# Patient Record
Sex: MALE | Race: WHITE | Employment: OTHER | URBAN - METROPOLITAN AREA
[De-identification: names, ages, dates, MRNs, and addresses within clinical notes are randomized per-mention and may not be internally consistent; named-entity substitution may affect disease eponyms.]

---

## 2018-09-14 ENCOUNTER — HOSPITAL ENCOUNTER (OUTPATIENT)
Dept: PREADMISSION TESTING | Age: 73
Discharge: HOME | End: 2018-09-14

## 2018-09-14 VITALS
WEIGHT: 196.13 LBS | SYSTOLIC BLOOD PRESSURE: 149 MMHG | BODY MASS INDEX: 29.05 KG/M2 | HEART RATE: 75 BPM | TEMPERATURE: 98.1 F | HEIGHT: 69 IN | DIASTOLIC BLOOD PRESSURE: 68 MMHG

## 2018-09-14 NOTE — PERIOP NOTES
PREOPERATIVE INSTRUCTIONS REVIEWED WITH PATIENT. Rabia Seay PATIENT GIVEN SSI INFECTIONS SHEET. PATIENT WAS GIVEN THE OPPORTUNITY TO ASK QUESTIONS ON THE INFORMATION PROVIDED.

## 2018-09-23 ENCOUNTER — ANESTHESIA EVENT (OUTPATIENT)
Dept: MEDSURG UNIT | Age: 73
End: 2018-09-23
Payer: MEDICARE

## 2018-09-24 ENCOUNTER — HOSPITAL ENCOUNTER (OUTPATIENT)
Age: 73
Setting detail: OUTPATIENT SURGERY
Discharge: HOME | End: 2018-09-24
Attending: SURGERY | Admitting: SURGERY
Payer: MEDICARE

## 2018-09-24 ENCOUNTER — ANESTHESIA (OUTPATIENT)
Dept: MEDSURG UNIT | Age: 73
End: 2018-09-24
Payer: MEDICARE

## 2018-09-24 VITALS
HEART RATE: 77 BPM | DIASTOLIC BLOOD PRESSURE: 88 MMHG | HEIGHT: 69 IN | WEIGHT: 196 LBS | BODY MASS INDEX: 29.03 KG/M2 | RESPIRATION RATE: 17 BRPM | SYSTOLIC BLOOD PRESSURE: 141 MMHG | TEMPERATURE: 98 F | OXYGEN SATURATION: 98 %

## 2018-09-24 PROBLEM — C44.111: Status: ACTIVE | Noted: 2018-09-24

## 2018-09-24 PROBLEM — C44.319 BASAL CELL CARCINOMA (BCC) OF FOREHEAD: Status: ACTIVE | Noted: 2018-09-24

## 2018-09-24 PROBLEM — L91.8 INFLAMED SKIN TAG: Status: ACTIVE | Noted: 2018-09-24

## 2018-09-24 PROCEDURE — 77030032490 HC SLV COMPR SCD KNE COVD -B: Performed by: SURGERY

## 2018-09-24 PROCEDURE — 76060000062 HC AMB SURG ANES 1 TO 1.5 HR: Performed by: SURGERY

## 2018-09-24 PROCEDURE — 74011000250 HC RX REV CODE- 250: Performed by: SURGERY

## 2018-09-24 PROCEDURE — 77030002996 HC SUT SLK J&J -A: Performed by: SURGERY

## 2018-09-24 PROCEDURE — 76030000001 HC AMB SURG OR TIME 1 TO 1.5: Performed by: SURGERY

## 2018-09-24 PROCEDURE — 88331 PATH CONSLTJ SURG 1 BLK 1SPC: CPT | Performed by: SURGERY

## 2018-09-24 PROCEDURE — 76210000040 HC AMBSU PH I REC FIRST 0.5 HR: Performed by: SURGERY

## 2018-09-24 PROCEDURE — 74011250636 HC RX REV CODE- 250/636: Performed by: ANESTHESIOLOGY

## 2018-09-24 PROCEDURE — 77030010829: Performed by: SURGERY

## 2018-09-24 PROCEDURE — 74011250636 HC RX REV CODE- 250/636

## 2018-09-24 PROCEDURE — 76210000050 HC AMBSU PH II REC 0.5 TO 1 HR: Performed by: SURGERY

## 2018-09-24 PROCEDURE — 74011000250 HC RX REV CODE- 250

## 2018-09-24 PROCEDURE — 77030011640 HC PAD GRND REM COVD -A: Performed by: SURGERY

## 2018-09-24 PROCEDURE — 77030020262 HC SOL INJ SOD CL 0.9% 100ML: Performed by: SURGERY

## 2018-09-24 PROCEDURE — 88305 TISSUE EXAM BY PATHOLOGIST: CPT | Performed by: SURGERY

## 2018-09-24 PROCEDURE — 77030002888 HC SUT CHRMC J&J -A: Performed by: SURGERY

## 2018-09-24 PROCEDURE — 77030018836 HC SOL IRR NACL ICUM -A: Performed by: SURGERY

## 2018-09-24 RX ORDER — PROPOFOL 10 MG/ML
INJECTION, EMULSION INTRAVENOUS AS NEEDED
Status: DISCONTINUED | OUTPATIENT
Start: 2018-09-24 | End: 2018-09-24 | Stop reason: HOSPADM

## 2018-09-24 RX ORDER — DIPHENHYDRAMINE HYDROCHLORIDE 50 MG/ML
12.5 INJECTION, SOLUTION INTRAMUSCULAR; INTRAVENOUS AS NEEDED
Status: DISCONTINUED | OUTPATIENT
Start: 2018-09-24 | End: 2018-09-24 | Stop reason: HOSPADM

## 2018-09-24 RX ORDER — TETRACAINE HYDROCHLORIDE 5 MG/ML
SOLUTION OPHTHALMIC AS NEEDED
Status: DISCONTINUED | OUTPATIENT
Start: 2018-09-24 | End: 2018-09-24 | Stop reason: HOSPADM

## 2018-09-24 RX ORDER — SODIUM CHLORIDE 0.9 % (FLUSH) 0.9 %
5-10 SYRINGE (ML) INJECTION AS NEEDED
Status: DISCONTINUED | OUTPATIENT
Start: 2018-09-24 | End: 2018-09-24 | Stop reason: HOSPADM

## 2018-09-24 RX ORDER — ONDANSETRON 2 MG/ML
4 INJECTION INTRAMUSCULAR; INTRAVENOUS AS NEEDED
Status: DISCONTINUED | OUTPATIENT
Start: 2018-09-24 | End: 2018-09-24 | Stop reason: HOSPADM

## 2018-09-24 RX ORDER — PROPOFOL 10 MG/ML
INJECTION, EMULSION INTRAVENOUS
Status: DISCONTINUED | OUTPATIENT
Start: 2018-09-24 | End: 2018-09-24 | Stop reason: HOSPADM

## 2018-09-24 RX ORDER — MIDAZOLAM HYDROCHLORIDE 1 MG/ML
1 INJECTION, SOLUTION INTRAMUSCULAR; INTRAVENOUS AS NEEDED
Status: DISCONTINUED | OUTPATIENT
Start: 2018-09-24 | End: 2018-09-24 | Stop reason: HOSPADM

## 2018-09-24 RX ORDER — SODIUM CHLORIDE 0.9 % (FLUSH) 0.9 %
5-10 SYRINGE (ML) INJECTION EVERY 8 HOURS
Status: DISCONTINUED | OUTPATIENT
Start: 2018-09-24 | End: 2018-09-24 | Stop reason: HOSPADM

## 2018-09-24 RX ORDER — MORPHINE SULFATE 10 MG/ML
2 INJECTION, SOLUTION INTRAMUSCULAR; INTRAVENOUS
Status: DISCONTINUED | OUTPATIENT
Start: 2018-09-24 | End: 2018-09-24 | Stop reason: HOSPADM

## 2018-09-24 RX ORDER — SODIUM CHLORIDE, SODIUM LACTATE, POTASSIUM CHLORIDE, CALCIUM CHLORIDE 600; 310; 30; 20 MG/100ML; MG/100ML; MG/100ML; MG/100ML
1000 INJECTION, SOLUTION INTRAVENOUS CONTINUOUS
Status: DISCONTINUED | OUTPATIENT
Start: 2018-09-24 | End: 2018-09-24 | Stop reason: HOSPADM

## 2018-09-24 RX ORDER — KETAMINE HYDROCHLORIDE 100 MG/ML
INJECTION, SOLUTION INTRAMUSCULAR; INTRAVENOUS AS NEEDED
Status: DISCONTINUED | OUTPATIENT
Start: 2018-09-24 | End: 2018-09-24 | Stop reason: HOSPADM

## 2018-09-24 RX ORDER — OXYCODONE HYDROCHLORIDE 5 MG/1
5 TABLET ORAL AS NEEDED
Status: DISCONTINUED | OUTPATIENT
Start: 2018-09-24 | End: 2018-09-24 | Stop reason: HOSPADM

## 2018-09-24 RX ORDER — BACITRACIN 500 [USP'U]/G
OINTMENT OPHTHALMIC AS NEEDED
Status: DISCONTINUED | OUTPATIENT
Start: 2018-09-24 | End: 2018-09-24 | Stop reason: HOSPADM

## 2018-09-24 RX ORDER — BACITRACIN 500 [USP'U]/G
OINTMENT TOPICAL AS NEEDED
Status: DISCONTINUED | OUTPATIENT
Start: 2018-09-24 | End: 2018-09-24 | Stop reason: HOSPADM

## 2018-09-24 RX ORDER — SODIUM CHLORIDE 9 MG/ML
25 INJECTION, SOLUTION INTRAVENOUS CONTINUOUS
Status: DISCONTINUED | OUTPATIENT
Start: 2018-09-24 | End: 2018-09-24 | Stop reason: HOSPADM

## 2018-09-24 RX ORDER — LIDOCAINE HYDROCHLORIDE AND EPINEPHRINE 10; 10 MG/ML; UG/ML
INJECTION, SOLUTION INFILTRATION; PERINEURAL AS NEEDED
Status: DISCONTINUED | OUTPATIENT
Start: 2018-09-24 | End: 2018-09-24 | Stop reason: HOSPADM

## 2018-09-24 RX ORDER — MIDAZOLAM HYDROCHLORIDE 1 MG/ML
INJECTION, SOLUTION INTRAMUSCULAR; INTRAVENOUS AS NEEDED
Status: DISCONTINUED | OUTPATIENT
Start: 2018-09-24 | End: 2018-09-24 | Stop reason: HOSPADM

## 2018-09-24 RX ORDER — FENTANYL CITRATE 50 UG/ML
50 INJECTION, SOLUTION INTRAMUSCULAR; INTRAVENOUS AS NEEDED
Status: DISCONTINUED | OUTPATIENT
Start: 2018-09-24 | End: 2018-09-24 | Stop reason: HOSPADM

## 2018-09-24 RX ORDER — MIDAZOLAM HYDROCHLORIDE 1 MG/ML
0.5 INJECTION, SOLUTION INTRAMUSCULAR; INTRAVENOUS
Status: DISCONTINUED | OUTPATIENT
Start: 2018-09-24 | End: 2018-09-24 | Stop reason: HOSPADM

## 2018-09-24 RX ORDER — LIDOCAINE HYDROCHLORIDE 10 MG/ML
0.1 INJECTION, SOLUTION EPIDURAL; INFILTRATION; INTRACAUDAL; PERINEURAL AS NEEDED
Status: DISCONTINUED | OUTPATIENT
Start: 2018-09-24 | End: 2018-09-24 | Stop reason: HOSPADM

## 2018-09-24 RX ORDER — BACITRACIN ZINC AND POLYMYXIN B SULFATE 500; 10000 [USP'U]/G; [USP'U]/G
OINTMENT OPHTHALMIC AS NEEDED
Status: DISCONTINUED | OUTPATIENT
Start: 2018-09-24 | End: 2018-09-24 | Stop reason: HOSPADM

## 2018-09-24 RX ORDER — SODIUM CHLORIDE, SODIUM LACTATE, POTASSIUM CHLORIDE, CALCIUM CHLORIDE 600; 310; 30; 20 MG/100ML; MG/100ML; MG/100ML; MG/100ML
100 INJECTION, SOLUTION INTRAVENOUS CONTINUOUS
Status: DISCONTINUED | OUTPATIENT
Start: 2018-09-24 | End: 2018-09-24 | Stop reason: HOSPADM

## 2018-09-24 RX ORDER — FENTANYL CITRATE 50 UG/ML
INJECTION, SOLUTION INTRAMUSCULAR; INTRAVENOUS AS NEEDED
Status: DISCONTINUED | OUTPATIENT
Start: 2018-09-24 | End: 2018-09-24 | Stop reason: HOSPADM

## 2018-09-24 RX ORDER — FENTANYL CITRATE 50 UG/ML
25 INJECTION, SOLUTION INTRAMUSCULAR; INTRAVENOUS
Status: DISCONTINUED | OUTPATIENT
Start: 2018-09-24 | End: 2018-09-24 | Stop reason: HOSPADM

## 2018-09-24 RX ORDER — ROPIVACAINE HYDROCHLORIDE 5 MG/ML
150 INJECTION, SOLUTION EPIDURAL; INFILTRATION; PERINEURAL AS NEEDED
Status: DISCONTINUED | OUTPATIENT
Start: 2018-09-24 | End: 2018-09-24 | Stop reason: HOSPADM

## 2018-09-24 RX ORDER — CEFAZOLIN SODIUM IN 0.9 % NACL 2 G/100 ML
PLASTIC BAG, INJECTION (ML) INTRAVENOUS AS NEEDED
Status: DISCONTINUED | OUTPATIENT
Start: 2018-09-24 | End: 2018-09-24 | Stop reason: HOSPADM

## 2018-09-24 RX ADMIN — FENTANYL CITRATE 25 MCG: 50 INJECTION, SOLUTION INTRAMUSCULAR; INTRAVENOUS at 07:30

## 2018-09-24 RX ADMIN — SODIUM CHLORIDE, SODIUM LACTATE, POTASSIUM CHLORIDE, AND CALCIUM CHLORIDE 1000 ML: 600; 310; 30; 20 INJECTION, SOLUTION INTRAVENOUS at 07:12

## 2018-09-24 RX ADMIN — PROPOFOL 50 MCG/KG/MIN: 10 INJECTION, EMULSION INTRAVENOUS at 07:38

## 2018-09-24 RX ADMIN — KETAMINE HYDROCHLORIDE 20 MG: 100 INJECTION, SOLUTION INTRAMUSCULAR; INTRAVENOUS at 07:39

## 2018-09-24 RX ADMIN — PROPOFOL 20 MG: 10 INJECTION, EMULSION INTRAVENOUS at 08:10

## 2018-09-24 RX ADMIN — PROPOFOL 20 MG: 10 INJECTION, EMULSION INTRAVENOUS at 07:35

## 2018-09-24 RX ADMIN — Medication 2 G: at 07:44

## 2018-09-24 RX ADMIN — MIDAZOLAM HYDROCHLORIDE 2 MG: 1 INJECTION, SOLUTION INTRAMUSCULAR; INTRAVENOUS at 07:30

## 2018-09-24 NOTE — ANESTHESIA POSTPROCEDURE EVALUATION
Post-Anesthesia Evaluation and Assessment Patient: Britney Wood MRN: [id number]  SSN: [id number] YOB: 1945  Age: 68 y.o. Sex: male Cardiovascular Function/Vital Signs Visit Vitals  /85  Pulse 84  Temp 36.7 °C (98.1 °F)  Resp 14  
 Ht 5' 9\" (1.753 m)  Wt 88.9 kg (196 lb)  SpO2 96%  BMI 28.94 kg/m2 Patient is status post MAC anesthesia for Procedure(s): EXCISION BASAL CELL CARCINOMA RIGHT MEDIAL CANTHUS FROZEN SECTION AND , EXCISION BASAL CELL CARCINOMA RIGHT FOREHEAD WITH REPAIR ,EXCISION SKIN TAG RIGHT NECK. Nausea/Vomiting: None Postoperative hydration reviewed and adequate. Pain: 
Pain Scale 1: Numeric (0 - 10) (09/24/18 0836) Pain Intensity 1: 0 (09/24/18 0836) Managed Neurological Status:  
Neuro (WDL): Within Defined Limits (09/24/18 0836) Neuro LUE Motor Response: Purposeful (09/24/18 0836) LLE Motor Response: Purposeful (09/24/18 0836) RUE Motor Response: Purposeful (09/24/18 0836) RLE Motor Response: Purposeful (09/24/18 0836) At baseline Mental Status and Level of Consciousness: Arousable Pulmonary Status:  
O2 Device: Room air (09/24/18 5764) Adequate oxygenation and airway patent Complications related to anesthesia: None Post-anesthesia assessment completed. No concerns Signed By: Siva Lea MD   
 September 24, 2018

## 2018-09-24 NOTE — DISCHARGE INSTRUCTIONS
Brady Peterson M.D., F.A.C.S. Stiven Thibodeaux M.D., F.A.C.S.,  Iain Causey III, M.D., F.A.C.S. Yen Priest M.D.,  Tevin Patel M.D. Instructions after Minor Plastic Surgery Procedures      You have had a minor surgical procedure. Please follow these simple instructions to help ensure a safe and comfortable recovery. Any questions can be directed to the office at [phone]. 1. If a bandage has been placed, it can be removed or changed at 24-48 hours after surgery. Wounds of the scalp are not usually bandaged, except in special situations. 2. Expect a modest amount of redness (inflammation) and possibly some bruising to appear in the days following your surgery. This is normal and will resolve as the wound heals, but may persist until the stitches have been removed. 3. The appearance of excessive wound redness, pus or fever is not normal and should be reported to the office. 4. Wounds may be gently washed with mild soap and water beginning 24 hours after surgery, then patted dry. Scalp wounds may be gently washed (mild shampoo) and gently dried as well. Use bacitracin eye ointment on the sutures of your medial canthus. Use regular bacitracin ointment on the sutures on your forehead and neck. 5. Antibiotic ointment should be lightly applied 2-3 times per day to any wound. Replace Band-Aid or other dressing as instructed. 6. Suture removal will be arranged in 5-14 days, depending upon the site. The pathologists report will be discussed with you then. Your appointment is ____CALL TO SCHEDULE for 1 week from now [phone]_.     7. Mild to moderate pain is to be expected after minor surgery and will generally be relieved by the use of Tylenol or ibuprofen agents (Advil, Motrin, Nuprin, etc.)     8.  Swelling or discomfort will be improved by elevating the surgical site above the level of the heart, especially the face, scalp or arms, which can be elevated in bed by extra pillows. 9. Do not be concerned if one or even several sutures come out prior to the time of suture removal. It is not unusual for this to occur as the wound swelling decreases. Support of the remaining sutures is sufficient to protect your wound(s). 10. If appropriate, copies of the surgery and pathology reports will be sent to your doctor. 11. Please call the office at [phone] if you have any questions. I acknowledge receipt of the above discharge instructions:    Patient/Guardian Signature _______________________________________ Date___________________    Daniel Vance Signature_______________________________________________ Date___________________        DISCHARGE SUMMARY from Nurse    PATIENT INSTRUCTIONS:    After general anesthesia or intravenous sedation, for 24 hours or while taking prescription Narcotics:  · Limit your activities  · Do not drive and operate hazardous machinery  · Do not make important personal or business decisions  · Do  not drink alcoholic beverages  · If you have not urinated within 8 hours after discharge, please contact your surgeon on call. Report the following to your surgeon:  · Excessive pain, swelling, redness or odor of or around the surgical area  · Temperature over 100.5  · Nausea and vomiting lasting longer than 4 hours or if unable to take medications  · Any signs of decreased circulation or nerve impairment to extremity: change in color, persistent  numbness, tingling, coldness or increase pain  · Any questions    What to do at Home:    If you experience any of the above symptoms, please follow up with Dr. Pat Trent. *  Please give a list of your current medications to your Primary Care Provider. *  Please update this list whenever your medications are discontinued, doses are      changed, or new medications (including over-the-counter products) are added.     *  Please carry medication information at all times in case of emergency situations. These are general instructions for a healthy lifestyle:    No smoking/ No tobacco products/ Avoid exposure to second hand smoke  Surgeon General's Warning:  Quitting smoking now greatly reduces serious risk to your health. Obesity, smoking, and sedentary lifestyle greatly increases your risk for illness    A healthy diet, regular physical exercise & weight monitoring are important for maintaining a healthy lifestyle    You may be retaining fluid if you have a history of heart failure or if you experience any of the following symptoms:  Weight gain of 3 pounds or more overnight or 5 pounds in a week, increased swelling in our hands or feet or shortness of breath while lying flat in bed. Please call your doctor as soon as you notice any of these symptoms; do not wait until your next office visit. Recognize signs and symptoms of STROKE:    F-face looks uneven    A-arms unable to move or move unevenly    S-speech slurred or non-existent    T-time-call 911 as soon as signs and symptoms begin-DO NOT go       Back to bed or wait to see if you get better-TIME IS BRAIN. Warning Signs of HEART ATTACK     Call 911 if you have these symptoms:   Chest discomfort. Most heart attacks involve discomfort in the center of the chest that lasts more than a few minutes, or that goes away and comes back. It can feel like uncomfortable pressure, squeezing, fullness, or pain.  Discomfort in other areas of the upper body. Symptoms can include pain or discomfort in one or both arms, the back, neck, jaw, or stomach.  Shortness of breath with or without chest discomfort.  Other signs may include breaking out in a cold sweat, nausea, or lightheadedness. Don't wait more than five minutes to call 911 - MINUTES MATTER! Fast action can save your life. Calling 911 is almost always the fastest way to get lifesaving treatment.  Emergency Medical Services staff can begin treatment when they arrive -- up to an hour sooner than if someone gets to the hospital by car. The discharge information has been reviewed with the patient and spouse. The patient and spouse verbalized understanding. Discharge medications reviewed with the patient and spouse and appropriate educational materials and side effects teaching were provided.

## 2018-09-24 NOTE — IP AVS SNAPSHOT
[address] [facility] [place] [address] 
[phone] Patient: Ayden Yepez MRN: [id number] THI:[id number] About your hospitalization You were admitted on:  September 24, 2018 You last received care in the:  [facility] PACU You were discharged on:  September 24, 2018 Why you were hospitalized Your primary diagnosis was:  Not on File Your diagnoses also included:  Basal Cell Carcinoma, Eyelid, Right, Inflamed Skin Tag, Basal Cell Carcinoma (Bcc) Of Forehead Follow-up Information Follow up With Details Comments Contact Info Micha Hutchison MD   Patient can only remember the practice name and not the physician 
  
 Osmar Cherry MD Schedule an appointment as soon as possible for a visit in 1 week(s)  [address] [facility] [address] [facility] 
[phone] Discharge Orders None A check landon indicates which time of day the medication should be taken. My Medications CONTINUE taking these medications Instructions Each Dose to Equal  
 Morning Noon Evening Bedtime  
 aspirin 81 mg tablet Your last dose was: Your next dose is: Take 81 mg by mouth daily. 81 mg  
    
   
   
   
  
 CRESTOR 5 mg tablet Generic drug:  rosuvastatin Your last dose was: Your next dose is: Take  by mouth daily. TRICOR PO Your last dose was: Your next dose is: Take 56 mg by mouth daily. 56 mg Discharge Instructions Dior Hopson M.D., F.A.C.S. Lyn Sanderson M.D., F.A.C.S.,  Peyton Lopez III, M.D., F.A.C.S. Kristian Kelly M.D.,  Yulia Rivera M.D. Instructions after Minor Plastic Surgery Procedures You have had a minor surgical procedure.  Please follow these simple instructions to help ensure a safe and comfortable recovery. Any questions can be directed to the office at [phone]. 1. If a bandage has been placed, it can be removed or changed at 24-48 hours after surgery. Wounds of the scalp are not usually bandaged, except in special situations. 2. Expect a modest amount of redness (inflammation) and possibly some bruising to appear in the days following your surgery. This is normal and will resolve as the wound heals, but may persist until the stitches have been removed. 3. The appearance of excessive wound redness, pus or fever is not normal and should be reported to the office. 4. Wounds may be gently washed with mild soap and water beginning 24 hours after surgery, then patted dry. Scalp wounds may be gently washed (mild shampoo) and gently dried as well. Use bacitracin eye ointment on the sutures of your medial canthus. Use regular bacitracin ointment on the sutures on your forehead and neck. 5. Antibiotic ointment should be lightly applied 2-3 times per day to any wound. Replace Band-Aid or other dressing as instructed. 6. Suture removal will be arranged in 5-14 days, depending upon the site. The pathologists report will be discussed with you then. Your appointment is ____CALL TO SCHEDULE for 1 week from now [phone]_.  
 
7. Mild to moderate pain is to be expected after minor surgery and will generally be relieved by the use of Tylenol or ibuprofen agents (Advil, Motrin, Nuprin, etc.) 8. Swelling or discomfort will be improved by elevating the surgical site above the level of the heart, especially the face, scalp or arms, which can be elevated in bed by extra pillows. 9. Do not be concerned if one or even several sutures come out prior to the time of suture removal. It is not unusual for this to occur as the wound swelling decreases. Support of the remaining sutures is sufficient to protect your wound(s). 10. If appropriate, copies of the surgery and pathology reports will be sent to your doctor. 11. Please call the office at [phone] if you have any questions. I acknowledge receipt of the above discharge instructions: 
 
Patient/Guardian Signature _______________________________________ Date___________________ Nurses Signature_______________________________________________ Date___________________ DISCHARGE SUMMARY from Nurse PATIENT INSTRUCTIONS: 
 
 
F-face looks uneven A-arms unable to move or move unevenly S-speech slurred or non-existent T-time-call 911 as soon as signs and symptoms begin-DO NOT go Back to bed or wait to see if you get better-TIME IS BRAIN. Warning Signs of HEART ATTACK Call 911 if you have these symptoms: 
? Chest discomfort. Most heart attacks involve discomfort in the center of the chest that lasts more than a few minutes, or that goes away and comes back. It can feel like uncomfortable pressure, squeezing, fullness, or pain. ? Discomfort in other areas of the upper body. Symptoms can include pain or discomfort in one or both arms, the back, neck, jaw, or stomach. ? Shortness of breath with or without chest discomfort. ? Other signs may include breaking out in a cold sweat, nausea, or lightheadedness. Don't wait more than five minutes to call [address]! Fast action can save your life. Calling 911 is almost always the fastest way to get lifesaving treatment. Emergency Medical Services staff can begin treatment when they arrive  up to an hour sooner than if someone gets to the hospital by car. The discharge information has been reviewed with the patient and spouse. The patient and spouse verbalized understanding. Discharge medications reviewed with the patient and spouse and appropriate educational materials and side effects teaching were provided. Introducing [facility]! [employer] introduces [employer] patient portal. Now you can access parts of your medical record, email your doctor's office, and request medication refills online. 1. In your internet browser, go to [email or web address] 2. Click on the First Time User? Click Here link in the Sign In box. You will see the New Member Sign Up page. 3. Enter your [employer] Access Code exactly as it appears below. You will not need to use this code after youve completed the sign-up process. If you do not sign up before the expiration date, you must request a new code. · [employer] Access Code: [email or web address] Expires: 12/13/2018  9:38 AM 
 
4. Enter the last four digits of your Social Security Number (xxxx) and Date of Birth (mm/dd/yyyy) as indicated and click Submit. You will be taken to the next sign-up page. 5. Create a [employer] ID. This will be your [employer] login ID and cannot be changed, so think of one that is secure and easy to remember. 6. Create a [employer] password. You can change your password at any time. 7. Enter your Password Reset Question and Answer. This can be used at a later time if you forget your password. 8. Enter your e-mail address. You will receive e-mail notification when new information is available in [address] 9. Click Sign Up. You can now view and download portions of your medical record. 10. Click the Download Summary menu link to download a portable copy of your medical information. If you have questions, please visit the Frequently Asked Questions section of the [employer] website. Remember, [employer] is NOT to be used for urgent needs. For medical emergencies, dial 911. Now available from your iPhone and Android! Introducing Eric Marr As a [facility] patient, I wanted to make you aware of our electronic visit tool called Eric Marr. [employer] allows you to connect within minutes with a medical provider 24 hours a day, seven days a week via a mobile device or tablet or logging into a secure website from your computer. You can access Eric Garciafin from anywhere in the [place]. A virtual visit might be right for you when you have a simple condition and feel like you just dont want to get out of bed, or cant get away from work for an appointment, when your regular [facility] provider is not available (evenings, weekends or holidays), or when youre out of town and need minor care. Electronic visits cost only $49 and if the [email or web address] provider determines a prescription is needed to treat your condition, one can be electronically transmitted to a nearby pharmacy*. Please take a moment to enroll today if you have not already done so. The enrollment process is free and takes just a few minutes. To enroll, please download the [employer] jessica to your tablet or phone, or visit [email or web address] to enroll on your computer. And, as an [address] [place] [address] patient with a Get[employer] account, the results of your visits will be scanned into your electronic medical record and your primary care provider will be able to view the scanned results. We urge you to continue to see your regular [facility] provider for your ongoing medical care. And while your primary care provider may not be the one available when you seek a Eric Metcalfeleazarfin virtual visit, the peace of mind you get from getting a real diagnosis real time can be priceless. For more information on Eric Metcalfeleazarfin, view our Frequently Asked Questions (FAQs) at [email or web address]. Sincerely, 
 
Padmini Jeter MD 
Chief Medical Officer [facility] *:  certain medications cannot be prescribed via Eric Marr Providers Seen During Your Hospitalization Provider Specialty Primary office phone Ancelmo Hoff MD Plastic Surgery [phone] Your Primary Care Physician (PCP) Primary Care Physician Office Phone Office Fax OTHER, PHYS ** None ** ** None ** You are allergic to the following No active allergies Recent Documentation Height Weight BMI Smoking Status 1.753 m 88.9 kg 28.94 kg/m2 Never Smoker Emergency Contacts Name Discharge Info Relation Home Work Mobile Naila Rahman DISCHARGE CAREGIVER [3] Spouse [3] [phone] Patient Belongings The following personal items are in your possession at time of discharge: 
  Dental Appliances: Partials Please provide this summary of care documentation to your next provider. Signatures-by signing, you are acknowledging that this After Visit Summary has been reviewed with you and you have received a copy. Patient Signature:  ____________________________________________________________ Date:  ____________________________________________________________  
  
Jaylen Das Provider Signature:  ____________________________________________________________ Date:  ____________________________________________________________

## 2018-09-24 NOTE — BRIEF OP NOTE
BRIEF OPERATIVE NOTE Date of Procedure: 9/24/2018 Preoperative Diagnosis: BCC RIGHT EYELID 
BCC RIGHT FOREHEAD ,SKIN TAG RIGHT NECK Postoperative Diagnosis: BCC RIGHT EYELID 
BCC RIGHT FOREHEAD ,SKIN TAG RIGHT NECK Procedure(s): EXCISION BASAL CELL CARCINOMA RIGHT MEDIAL CANTHUS FROZEN SECTION AND FLAP OR FULL THICKNESS SKIN GRAFT, RECONSTRUCTION, EXCISION BASAL CELL CARCINOMA RIGHT FOREHEAD WITH REPAIR ,EXCISION SKIN TAG RIGHT NECK SPLIT THICKNESS SKIN GRAFT UPPER EXTREMITY Surgeon(s) and Role: 
   * Kaela Meyer MD - Primary Surgical Assistant: none Surgical Staff: 
Circ-1: Wale Brown RN Scrub Tech-1: Tammie Greer Event Time In Incision Start 1897 Incision Close 3334 Anesthesia: MAC Estimated Blood Loss: minimal 
Specimens:  
ID Type Source Tests Collected by Time Destination 1 : [address] Frozen Section Tissue  Shelby Wong III, MD 9/24/2018 7081 Pathology 2 : PROBABLE BCCA RIGHT FOREHEAD Fresh Tissue  Shelby Wong III, MD 9/24/2018 7362 Pathology 3 : SKIN TAG RIGHT NECK Fresh Skin Lesion  Shelby Wong III, MD 9/24/2018 0453 Pathology Findings: No residual BCCA with clear margins on frozen section. Clear margins by 3 mm grossly on forehead lesion. Complications: none Implants: * No implants in log *

## 2018-09-24 NOTE — H&P
Pre-op History and Physical 
 
CC: BCC RIGHT EYELID 
BCC RIGHT FOREHEAD   
HPI: 68y.o. year old male with BCC RIGHT EYELID 
BCC RIGHT FOREHEAD  for Procedure(s): EXCISION BASAL CELL CARCINOMA RIGHT MEDIAL CANTHUS FROZEN SECTION AND FLAP OR FULL THICKNESS SKIN GRAFT RECONSTRUCTION, EXCISION BASAL CELL CARCINOMA RIGHT FOREHEAD WITH REPAIR  
SPLIT THICKNESS SKIN GRAFT UPPER EXTREMITY. Past medical history:  
Past Medical History:  
Diagnosis Date  Atherosclerosis  CAD (coronary artery disease) 2014  
 cardiac stents  Cancer [facility]) 2005 MELANOMA-WITH REOCCURANCE  
 High cholesterol  Sleep apnea   
 doesn't use CPAP Past surgical history:  
Past Surgical History:  
Procedure Laterality Date  HX GI    
 COLONOSCOPY/POLYPS  
 HX HEENT  2005 RESECTION MELANOMA FROM NOSE  
 HX HERNIA REPAIR Right 2015 INGUINAL  VASCULAR SURGERY PROCEDURE UNLIST    
 left carotid endarterectomy Family history:  
Family History Problem Relation Age of Onset  Heart Disease Mother CAD  Hypertension Mother  Cancer Father LIVER CA  
 Coronary Artery Disease Brother  Anesth Problems Neg Hx Social history:  
Social History Social History  Marital status:  Spouse name: N/A  
 Number of children: N/A  
 Years of education: N/A Occupational History  Not on file. Social History Main Topics  Smoking status: Never Smoker  Smokeless tobacco: Never Used  Alcohol use No  
 Drug use: No  
 Sexual activity: Not on file Other Topics Concern  Not on file Social History Narrative Home Medications:  
Prior to Admission medications Medication Sig Start Date End Date Taking? Authorizing Provider  
rosuvastatin (CRESTOR) 5 mg tablet Take  by mouth daily. Yes Historical Provider  
aspirin 81 mg tablet Take 81 mg by mouth daily. Yes Historical Provider FENOFIBRATE NANOCRYSTALLIZED (TRICOR PO) Take 56 mg by mouth daily.    Yes Historical Provider Allergies: No Known Allergies Review of systems:  Denies headache, fever, chills, weight change, congestion, sore throat, chest pain, shortness of breath, nausea, vomiting, diarrhea, constipation, abdominal pain, generalized weakness, muscle or joint pain, and rash. Physical Exam: 
Vitals: Blood pressure 149/83, pulse 72, temperature 98 °F (36.7 °C), resp. rate 14, height 5' 9\" (1.753 m), weight 88.9 kg (196 lb), SpO2 99 %. General: awake and alert, NAD Neck: supple Breasts:  no known breast pathology Cor: RRR Lungs: clear Abdomen: soft, non-tender, non-distended Extremities: no edema Skin: basal cell carcinoma of right medial canthus, probable basal cell carcinoma of forehead and irritated skin tag right neck. Impression: BCC RIGHT EYELID 
BCC RIGHT FOREHEAD Plan:  Procedure(s): EXCISION BASAL CELL CARCINOMA RIGHT MEDIAL CANTHUS FROZEN SECTION AND FLAP OR FULL THICKNESS SKIN GRAFT RECONSTRUCTION, EXCISION BASAL CELL CARCINOMA RIGHT FOREHEAD WITH REPAIR  
SPLIT THICKNESS SKIN GRAFT UPPER EXTREMITY I would recommend excision of the skin tag of his neck as well today. It is bothering him when he wears his uniform as a  and is very irritated.

## 2018-09-24 NOTE — ROUTINE PROCESS
Patient: Celi Chaves MRN: [id number]  SSN: [id number] YOB: 1945  Age: 68 y.o. Sex: male Patient is status post Procedure(s): EXCISION BASAL CELL CARCINOMA RIGHT MEDIAL CANTHUS FROZEN SECTION AND , EXCISION BASAL CELL CARCINOMA RIGHT FOREHEAD WITH REPAIR ,EXCISION SKIN TAG RIGHT NECK. Surgeon(s) and Role: 
   * Josafat Thurston MD - Primary Local/Dose/Irrigation:  SEE MAR Peripheral IV 09/24/18 Left (Active) Site Assessment Clean, dry, & intact 9/24/2018  8:45 AM  
Phlebitis Assessment 0 9/24/2018  8:45 AM  
Infiltration Assessment 0 9/24/2018  8:45 AM  
Dressing Status Clean, dry, & intact 9/24/2018  8:45 AM  
Dressing Type Transparent;Tape 9/24/2018  8:45 AM  
Hub Color/Line Status Blue;Patent; Infusing 9/24/2018  8:45 AM  
Alcohol Cap Used Yes 9/24/2018  8:45 AM  
                 
 
 
 
Dressing/Packing:  Wound Neck Right-DRESSING TYPE: 4 x 4;Adhesive wound dressing (Mastisol); Other (Comment) (paper tape) (09/24/18 9954) Wound Eye Right-DRESSING TYPE:  (none, DUC) (09/24/18 1429) Wound Face Right-DRESSING TYPE: 4 x 4;Adhesive wound dressing (Mastisol); Other (Comment) (paper tape) (09/24/18 8567) Splint/Cast:  ] Other:

## 2018-09-24 NOTE — ANESTHESIA PREPROCEDURE EVALUATION
Anesthetic History No history of anesthetic complications Review of Systems / Medical History Patient summary reviewed, nursing notes reviewed and pertinent labs reviewed Pulmonary Sleep apnea: CPAP Neuro/Psych Within defined limits Cardiovascular CAD and cardiac stents GI/Hepatic/Renal 
Within defined limits Endo/Other Within defined limits Other Findings Physical Exam 
 
Airway Mallampati: II 
TM Distance: > 6 cm Neck ROM: normal range of motion Mouth opening: Normal 
 
 Cardiovascular Regular rate and rhythm,  S1 and S2 normal,  no murmur, click, rub, or gallop Dental 
No notable dental hx Pulmonary Breath sounds clear to auscultation Abdominal 
GI exam deferred Other Findings Anesthetic Plan ASA: 3 Anesthesia type: MAC Induction: Intravenous Anesthetic plan and risks discussed with: Patient

## 2018-09-26 NOTE — OP NOTES
[address]  OPERATIVE REPORT    Amandeep Sandoval  MR#: [id number]  : 1945  ACCOUNT #: [de-identified]   DATE OF SERVICE: 2018    PREOPERATIVE DIAGNOSIS:    1. An 8 x 8 mm basal cell carcinoma of the right medial canthus. 2.  A 1 x 1 cm probable basal cell carcinoma of the right forehead. 3.  Irritated skin tag of the right neck. POSTOPERATIVE DIAGNOSIS:    1. An 8 x 8 mm basal cell carcinoma of the right medial canthus. 2.  A 1 x 1 cm probable basal cell carcinoma of the right forehead. 3.  Irritated skin tag of the right neck. PROCEDURES PERFORMED:  1. Excision basal cell carcinoma, right medial canthus with 3 mm margins and flap closure of 2 sq cm medial canthal defect. 2.  Excision of probable squamous cell carcinoma, right forehead with 3 mm margins and intermediate repair of 3 cm forehead wound. 3.  Removal of irritated skin tag, right neck. SURGEON:  Ashley Belle MD    ASSISTANT:  Mark Ruggiero    ANESTHESIA:  MAC    ESTIMATED BLOOD LOSS:  Negligible. COMPLICATIONS:  none    SPECIMENS REMOVED:  See op note    IMPLANTS:  none    INDICATIONS:  A 68year-old patient was brought to the operating room today for surgical treatment of these 3 skin lesions of concern. The medial canthal basal cell had been biopsied and he was brought here today primarily for treatment of this lesion. He had a second lesion on the forehead which was almost certainly a nodular basal cell. Finally, he had an irritated pedunculated skin tag of the right neck, which was itching him and bleeding. Preoperatively, he was marked for surgery and the details of the planned procedure were discussed with him including the scars which would result and the risk involved. He appeared to understand all these considerations. FINDINGS AND PROCEDURE:  The patient was brought to the operating room and sedation anesthesia was induced.   Local anesthesia was induced in all surgical sites with 1% lidocaine 1:100,000 epinephrine. The face and neck were then prepped and draped into a sterile field. I began the procedure with a circular excision of the basal cell of the medial canthus. This was marked and sent for frozen section. The deep plane went down to the canthal tendon, which was not disrupted during the course of the excision. While we were awaiting frozen section analysis of this tissue. The basal cell of the forehead was removed and sent for permanent pathologic examination. After hemostasis was secured, closure was accomplished after undermining with 4-0 Vicryl in the deep dermis with buried knots and running 4-0 Prolene in the skin. I then clipped off the skin tag and lightly cauterized the base. This was also sent for permanent pathologic examination. At this point, the frozen section returned no residual basal cells seen with clear margins from the medial canthal region. An advancement flap of inferior canthal skin was designed, cut, raised and brought into position. After careful skin trimming and tissue rearrangement, hemostasis was secured and the wounds were closed with interrupted 5-0 Prolene sutures. Sterile dressings were applied to all wounds. The patient awoke from the anesthetic, went to the recovery room in good condition. Final sponge and needle counts were reported to be correct. Blood loss for this procedure was negligible.       MD FELI Schuler /   D: 09/26/2018 12:34     T: 09/26/2018 13:10  JOB #: [id number]

## 2022-03-18 PROBLEM — L91.8 INFLAMED SKIN TAG: Status: ACTIVE | Noted: 2018-09-24

## 2022-03-19 PROBLEM — C44.111: Status: ACTIVE | Noted: 2018-09-24

## 2022-03-19 PROBLEM — C44.319 BASAL CELL CARCINOMA (BCC) OF FOREHEAD: Status: ACTIVE | Noted: 2018-09-24

## 2024-02-29 ENCOUNTER — HOSPITAL ENCOUNTER (OUTPATIENT)
Facility: HOSPITAL | Age: 79
End: 2024-02-29
Attending: INTERNAL MEDICINE | Admitting: INTERNAL MEDICINE
Payer: MEDICARE

## 2024-02-29 VITALS
RESPIRATION RATE: 18 BRPM | OXYGEN SATURATION: 98 % | HEART RATE: 70 BPM | BODY MASS INDEX: 28.14 KG/M2 | SYSTOLIC BLOOD PRESSURE: 143 MMHG | WEIGHT: 190 LBS | HEIGHT: 69 IN | DIASTOLIC BLOOD PRESSURE: 75 MMHG | TEMPERATURE: 97.6 F

## 2024-02-29 DIAGNOSIS — R94.39 ABNORMAL STRESS TEST: ICD-10-CM

## 2024-02-29 PROCEDURE — C1894 INTRO/SHEATH, NON-LASER: HCPCS | Performed by: INTERNAL MEDICINE

## 2024-02-29 PROCEDURE — 2500000003 HC RX 250 WO HCPCS: Performed by: INTERNAL MEDICINE

## 2024-02-29 PROCEDURE — 6360000004 HC RX CONTRAST MEDICATION: Performed by: INTERNAL MEDICINE

## 2024-02-29 PROCEDURE — 99152 MOD SED SAME PHYS/QHP 5/>YRS: CPT | Performed by: INTERNAL MEDICINE

## 2024-02-29 PROCEDURE — 99153 MOD SED SAME PHYS/QHP EA: CPT | Performed by: INTERNAL MEDICINE

## 2024-02-29 PROCEDURE — C1769 GUIDE WIRE: HCPCS | Performed by: INTERNAL MEDICINE

## 2024-02-29 PROCEDURE — 6360000002 HC RX W HCPCS: Performed by: INTERNAL MEDICINE

## 2024-02-29 PROCEDURE — 2709999900 HC NON-CHARGEABLE SUPPLY: Performed by: INTERNAL MEDICINE

## 2024-02-29 PROCEDURE — 93458 L HRT ARTERY/VENTRICLE ANGIO: CPT | Performed by: INTERNAL MEDICINE

## 2024-02-29 PROCEDURE — 6370000000 HC RX 637 (ALT 250 FOR IP): Performed by: INTERNAL MEDICINE

## 2024-02-29 RX ORDER — HEPARIN SODIUM 10000 [USP'U]/ML
INJECTION, SOLUTION INTRAVENOUS; SUBCUTANEOUS PRN
Status: DISCONTINUED | OUTPATIENT
Start: 2024-02-29 | End: 2024-02-29 | Stop reason: HOSPADM

## 2024-02-29 RX ORDER — ROSUVASTATIN CALCIUM 20 MG/1
20 TABLET, COATED ORAL DAILY
Status: ON HOLD | COMMUNITY

## 2024-02-29 RX ORDER — METOPROLOL SUCCINATE 25 MG/1
25 TABLET, EXTENDED RELEASE ORAL DAILY
Status: ON HOLD | COMMUNITY

## 2024-02-29 RX ORDER — LIDOCAINE HYDROCHLORIDE 10 MG/ML
INJECTION, SOLUTION INFILTRATION; PERINEURAL PRN
Status: DISCONTINUED | OUTPATIENT
Start: 2024-02-29 | End: 2024-02-29 | Stop reason: HOSPADM

## 2024-02-29 RX ORDER — FENTANYL CITRATE 50 UG/ML
INJECTION, SOLUTION INTRAMUSCULAR; INTRAVENOUS PRN
Status: DISCONTINUED | OUTPATIENT
Start: 2024-02-29 | End: 2024-02-29 | Stop reason: HOSPADM

## 2024-02-29 RX ORDER — SODIUM CHLORIDE 0.9 % (FLUSH) 0.9 %
5-40 SYRINGE (ML) INJECTION PRN
OUTPATIENT
Start: 2024-02-29

## 2024-02-29 RX ORDER — FENOFIBRATE 54 MG/1
54 TABLET ORAL DAILY
Status: ON HOLD | COMMUNITY

## 2024-02-29 RX ORDER — HEPARIN SODIUM 1000 [USP'U]/ML
INJECTION, SOLUTION INTRAVENOUS; SUBCUTANEOUS PRN
Status: DISCONTINUED | OUTPATIENT
Start: 2024-02-29 | End: 2024-02-29 | Stop reason: HOSPADM

## 2024-02-29 RX ORDER — ASPIRIN 81 MG/1
TABLET, CHEWABLE ORAL PRN
Status: DISCONTINUED | OUTPATIENT
Start: 2024-02-29 | End: 2024-02-29 | Stop reason: HOSPADM

## 2024-02-29 RX ORDER — SODIUM CHLORIDE 9 MG/ML
INJECTION, SOLUTION INTRAVENOUS PRN
OUTPATIENT
Start: 2024-02-29

## 2024-02-29 RX ORDER — SODIUM CHLORIDE 0.9 % (FLUSH) 0.9 %
5-40 SYRINGE (ML) INJECTION EVERY 12 HOURS SCHEDULED
OUTPATIENT
Start: 2024-02-29

## 2024-02-29 RX ORDER — VERAPAMIL HYDROCHLORIDE 2.5 MG/ML
INJECTION, SOLUTION INTRAVENOUS PRN
Status: DISCONTINUED | OUTPATIENT
Start: 2024-02-29 | End: 2024-02-29 | Stop reason: HOSPADM

## 2024-02-29 RX ORDER — ACETAMINOPHEN 325 MG/1
650 TABLET ORAL EVERY 4 HOURS PRN
OUTPATIENT
Start: 2024-02-29

## 2024-02-29 RX ORDER — ASPIRIN 81 MG/1
81 TABLET, CHEWABLE ORAL DAILY
Status: ON HOLD | COMMUNITY

## 2024-02-29 NOTE — PROGRESS NOTES
I have reviewed Discharge Instructions with the patient. The patient verbalized understanding. Discharge medications reviewed with patient along with appropriate educational materials.  Opportunity for questions and clarification was provided. All lines removed without difficulty. Cath sites are clean, dry, and intact. Patient's belongings gathered and with patient. Patient is ready for discharge.

## 2024-02-29 NOTE — PROGRESS NOTES
TRANSFER - IN REPORT:    Verbal report received from [facility] on Vernon James  being received from [facility] for routine care . Report consisted of patient’s Situation, Background, Assessment and Recommendations(SBAR). Information from the following report(s) procedure log was reviewed with the receiving clinician. Opportunity for questions and clarification was provided. Assessment completed upon patient’s arrival to Cardiac Cath Lab RECOVERY AREA and care assumed.       Cardiac Cath Lab Recovery Arrival Note:    Vernon James arrived to [facility] recovery area.  Patient procedure= LHC. Patient on cardiac monitor, non-invasive blood pressure, SPO2 monitor. On RA  Patient status doing well without problems. Patient is A&Ox 4. Patient reports no pain.    PROCEDURE SITE CHECK:    Procedure site:without any bleeding and no hematoma, denies pain/discomfort reported at procedure site.     No change in patient status. Continue to monitor patient and status.

## 2024-02-29 NOTE — DISCHARGE INSTRUCTIONS
[address], [address]    [phone]  [place] [address]    [email or web address]    Patient Discharge Instructions    Vernon James / [id number] : 1945    Admitted 2024 Discharged 2024     It is important that you take the medication exactly as they are prescribed.   Keep your medication in the bottles provided by the pharmacist and keep a list of the medication names, dosages, and times to be taken in your wallet.   Do not take other medications without consulting your doctor.     BRING ALL OF YOUR MEDICINES or a list of medicines with dosages TO YOUR OFFICE VISIT with Dr. Venegas.    Cardiac Catheterization  Discharge Instructions  Transradial Catheterization Discharge Instructions (WRIST)     Discharge instructions:   Your radial artery in your wrist was used for your cardiac catheterization. This site may be slightly bruised and sore following your procedure. Expect mild tingling or the hand and tenderness at the puncture site for up to 3 days. Excess movement of the wrist used should be avoided for the next 24-48 hours.   No lifting over 2 pounds (approximately a ½ gallon of milk) with this arm for 24 hours.   Keep the site of the procedure covered with a bandage for 24 hours.   You may shower the day after your procedure. Do not take a tub bath or submerge the puncture site in water for 48 hours.   No heavy impact activity/lifting > 10 pounds for 1 week.      If bleeding of the wrist occurs at home:   If the site on your wrist where you had the catheterization procedure begins to bleed, do not panic.   Place 1 or 2 fingers over the puncture site and hold pressure to stop the bleeding. You may be able to feel your pulse as you hold pressure.   Lift your fingers after 5 minutes to see if the bleeding has stopped.   Once the bleeding has stopped, gently wipe the wrist area clean and cover with a bandage.      If the bleeding from your wrist does not stop after 15 minutes, or if  there is a large amount of bleeding or spurting, call 911 immediately (do not drive yourself to the hospital).      Other concerns:   The site may be slightly bruised and sore following your procedure. Should any of the following occur, contact your physician immediately:  Any cool or coldness of the arm, discoloration over a large area, ongoing numbness or any abnormal sensations , moderate to severe pain or swelling in the arm.    Redness, soreness, swelling, chills or fever, or colored drainage at the procedure site within 3-7 days after your procedure.        If you are smoking, please stop. Smoking Cessation Program is a free, phone/text/email based, smoking cessation program. The program is individualized to meet each patient's needs. To enroll use this link https://ha.[employer]/ra/survey/8270        Information obtained by :  I understand that if any problems occur once I am at home I am to contact my physician.    I understand and acknowledge receipt of the instructions indicated above.                                                                                                                                           R.N.'s Signature                                                                  Date/Time                                                                                                                                              Patient or Representative Signature                                                          Date/Time      Logan Venegas MD          [address], [address]    [phone]  [place] [address]    [email or web address]

## 2024-02-29 NOTE — PROGRESS NOTES
LHC, cor angio completed s complic   [place]    Findings    1- diffuse CAD with 100% PL occlusion and mid- moderate dz elsewhere  2- LVEDP 20  3- AV gradient 20-30 mm Hg on pullback    [facility]

## 2024-03-01 LAB — ECHO BSA: 2.05 M2

## 2024-03-01 NOTE — CARDIO/PULMONARY
Chart reviewed: Patient is 78 y.o. male admitted with Abnormal stress test [R94.39]    Cardiac cath 2/29/24 without intervention.    Tamika Lamar RN

## 2024-03-01 NOTE — PROCEDURES
[facility]              [address] [place]  [address]                              CARDIAC CATH      PATIENT NAME: DOMONIQUE FORBES              : 1945  MED REC NO: [id number]                       ROOM: [facility]  ACCOUNT NO: [id number]                       ADMIT DATE: 2024  PROVIDER: Logan Venegas MD    DATE OF SERVICE:  2024    SURGEON:  Logan Venegas MD    PROCEDURES:  Left heart catheterization, coronary angiography.    INDICATIONS:  Shortness of breath, abnormal nuclear stress showing myocardial ischemia in multiple distributions.    TECHNIQUE:  Right radial.  Access was obtained using 2D ultrasound for guidance.    CATHETERS USED:  5-Angolan JL3.5, 5-Angolan JR4.    MEDICATIONS USED:  Please see the separate cath lab log sheet.    COMPLICATIONS:  None.    ESTIMATED BLOOD LOSS:  Less than 10 mL.    SPECIMENS REMOVED:  None.    ANESTHESIA:  Local with conscious sedation.    FINDINGS:  Hemodynamics:  Aortic pressure was 124/74 with a mean of 97, left ventricular pressure was 175/13 with an EDP of 20.  These were not simultaneous pressures.  On pullback across the aortic valve, there was approximately a 25-30 mm peak to peak gradient.    Coronary angiography revealed a right-dominant system.  The left main was a long vessel without significant disease.  The LAD was a long vessel extending to the apex.  There was diffuse nonobstructive disease.  The previously stented area in the mid LAD was patent and had a 30% in-stent restenosis.  D1 was a moderate-to-large vessel without significant disease.  D2 was a large vessel with mild to moderate nonobstructive disease.  There was some collateral filling of the posterolateral branch from both the left circumflex and distal LAD.  Left circumflex is moderate in size and had diffuse nonobstructive disease.  There was a large first marginal branch with a stented area in the proximal portion of this vessel  which was widely patent.  There were no other sizable marginal vessels present.  The right coronary artery was a fairly large dominant vessel with diffuse mild to moderate nonobstructive disease.  There was a large PDA without significant disease.  The posterolateral branch was moderate-to-large in size and appeared to be occluded in its midportion.  There was collateral filling of the distal vessel via branches from the left coronary artery.    CONCLUSIONS:    1. Diffuse nonobstructive disease with significant branch vessel disease involving the distal right coronary artery, which is well collateralized.  2. No left ventriculography was performed.  3. Mildly elevated LVEDP.  4. 25-30 mm peak to peak gradient on pullback across the aortic valve consistent with known mild-to-moderate aortic stenosis.    RECOMMENDATIONS:  Continue medical therapy.        KENYATTA VENEGAS MD      BKH/AQS  D:  03/01/2024 09:03:02  T:  03/01/2024 10:33:58  JOB #:  [id number]    CC:   Kenyatta Venegas MD

## 2024-09-16 ENCOUNTER — HOSPITAL ENCOUNTER (OUTPATIENT)
Facility: HOSPITAL | Age: 79
Setting detail: OBSERVATION
Discharge: HOME | End: 2024-09-17
Attending: INTERNAL MEDICINE | Admitting: INTERNAL MEDICINE
Payer: MEDICARE

## 2024-09-16 DIAGNOSIS — R07.9 CHEST PAIN: ICD-10-CM

## 2024-09-16 DIAGNOSIS — I35.0 AORTIC STENOSIS, SEVERE: Primary | ICD-10-CM

## 2024-09-16 LAB
ALBUMIN SERPL-MCNC: 3.2 G/DL (ref 3.5–5)
ALBUMIN/GLOB SERPL: 1.1 (ref 1.1–2.2)
ALP SERPL-CCNC: 56 U/L (ref 45–117)
ALT SERPL-CCNC: 25 U/L (ref 12–78)
ANION GAP SERPL CALC-SCNC: 5 MMOL/L (ref 2–12)
AST SERPL-CCNC: 15 U/L (ref 15–37)
BASOPHILS # BLD: 0 K/UL (ref 0–0.1)
BASOPHILS NFR BLD: 0 % (ref 0–1)
BILIRUB SERPL-MCNC: 0.7 MG/DL (ref 0.2–1)
BUN SERPL-MCNC: 17 MG/DL (ref 6–20)
BUN/CREAT SERPL: 15 (ref 12–20)
CALCIUM SERPL-MCNC: 8.1 MG/DL (ref 8.5–10.1)
CHLORIDE SERPL-SCNC: 109 MMOL/L (ref 97–108)
CO2 SERPL-SCNC: 26 MMOL/L (ref 21–32)
CREAT SERPL-MCNC: 1.12 MG/DL (ref 0.7–1.3)
DIFFERENTIAL METHOD BLD: ABNORMAL
ECHO BSA: 2.06 M2
EOSINOPHIL # BLD: 0.1 K/UL (ref 0–0.4)
EOSINOPHIL NFR BLD: 1 % (ref 0–7)
ERYTHROCYTE [DISTWIDTH] IN BLOOD BY AUTOMATED COUNT: 12.7 % (ref 11.5–14.5)
GLOBULIN SER CALC-MCNC: 2.9 G/DL (ref 2–4)
GLUCOSE SERPL-MCNC: 100 MG/DL (ref 65–100)
HCT VFR BLD AUTO: 39.9 % (ref 36.6–50.3)
HGB BLD-MCNC: 13.4 G/DL (ref 12.1–17)
IMM GRANULOCYTES # BLD AUTO: 0 K/UL (ref 0–0.04)
IMM GRANULOCYTES NFR BLD AUTO: 1 % (ref 0–0.5)
INR PPP: 1 (ref 0.9–1.1)
LYMPHOCYTES # BLD: 1.1 K/UL (ref 0.8–3.5)
LYMPHOCYTES NFR BLD: 19 % (ref 12–49)
MAGNESIUM SERPL-MCNC: 2 MG/DL (ref 1.6–2.4)
MCH RBC QN AUTO: 30.5 PG (ref 26–34)
MCHC RBC AUTO-ENTMCNC: 33.6 G/DL (ref 30–36.5)
MCV RBC AUTO: 90.7 FL (ref 80–99)
MONOCYTES # BLD: 0.5 K/UL (ref 0–1)
MONOCYTES NFR BLD: 9 % (ref 5–13)
NEUTS SEG # BLD: 4.1 K/UL (ref 1.8–8)
NEUTS SEG NFR BLD: 70 % (ref 32–75)
NRBC # BLD: 0 K/UL (ref 0–0.01)
NRBC BLD-RTO: 0 PER 100 WBC
NT PRO BNP: 352 PG/ML
PLATELET # BLD AUTO: 173 K/UL (ref 150–400)
PMV BLD AUTO: 10.6 FL (ref 8.9–12.9)
POTASSIUM SERPL-SCNC: 4 MMOL/L (ref 3.5–5.1)
PROT SERPL-MCNC: 6.1 G/DL (ref 6.4–8.2)
PROTHROMBIN TIME: 10.9 SEC (ref 9–11.1)
RBC # BLD AUTO: 4.4 M/UL (ref 4.1–5.7)
SODIUM SERPL-SCNC: 140 MMOL/L (ref 136–145)
TSH SERPL DL<=0.05 MIU/L-ACNC: 1.81 UIU/ML (ref 0.36–3.74)
WBC # BLD AUTO: 5.8 K/UL (ref 4.1–11.1)

## 2024-09-16 PROCEDURE — 6360000002 HC RX W HCPCS: Performed by: INTERNAL MEDICINE

## 2024-09-16 PROCEDURE — 84443 ASSAY THYROID STIM HORMONE: CPT

## 2024-09-16 PROCEDURE — C1887 CATHETER, GUIDING: HCPCS | Performed by: INTERNAL MEDICINE

## 2024-09-16 PROCEDURE — 93453 R&L HRT CATH W/VENTRICLGRPHY: CPT | Performed by: INTERNAL MEDICINE

## 2024-09-16 PROCEDURE — 80053 COMPREHEN METABOLIC PANEL: CPT

## 2024-09-16 PROCEDURE — 36415 COLL VENOUS BLD VENIPUNCTURE: CPT

## 2024-09-16 PROCEDURE — 83735 ASSAY OF MAGNESIUM: CPT

## 2024-09-16 PROCEDURE — 85610 PROTHROMBIN TIME: CPT

## 2024-09-16 PROCEDURE — 83036 HEMOGLOBIN GLYCOSYLATED A1C: CPT

## 2024-09-16 PROCEDURE — G0378 HOSPITAL OBSERVATION PER HR: HCPCS

## 2024-09-16 PROCEDURE — 6370000000 HC RX 637 (ALT 250 FOR IP): Performed by: INTERNAL MEDICINE

## 2024-09-16 PROCEDURE — C1769 GUIDE WIRE: HCPCS | Performed by: INTERNAL MEDICINE

## 2024-09-16 PROCEDURE — 85025 COMPLETE CBC W/AUTO DIFF WBC: CPT

## 2024-09-16 PROCEDURE — 6360000004 HC RX CONTRAST MEDICATION: Performed by: INTERNAL MEDICINE

## 2024-09-16 PROCEDURE — 83880 ASSAY OF NATRIURETIC PEPTIDE: CPT

## 2024-09-16 PROCEDURE — C1894 INTRO/SHEATH, NON-LASER: HCPCS | Performed by: INTERNAL MEDICINE

## 2024-09-16 PROCEDURE — 99152 MOD SED SAME PHYS/QHP 5/>YRS: CPT | Performed by: INTERNAL MEDICINE

## 2024-09-16 PROCEDURE — 76937 US GUIDE VASCULAR ACCESS: CPT | Performed by: INTERNAL MEDICINE

## 2024-09-16 PROCEDURE — 2580000003 HC RX 258: Performed by: INTERNAL MEDICINE

## 2024-09-16 PROCEDURE — 2500000003 HC RX 250 WO HCPCS: Performed by: INTERNAL MEDICINE

## 2024-09-16 PROCEDURE — 2709999900 HC NON-CHARGEABLE SUPPLY: Performed by: INTERNAL MEDICINE

## 2024-09-16 PROCEDURE — 99153 MOD SED SAME PHYS/QHP EA: CPT | Performed by: INTERNAL MEDICINE

## 2024-09-16 RX ORDER — SODIUM CHLORIDE 9 MG/ML
INJECTION, SOLUTION INTRAVENOUS CONTINUOUS
Status: ACTIVE | OUTPATIENT
Start: 2024-09-16 | End: 2024-09-16

## 2024-09-16 RX ORDER — SODIUM CHLORIDE 0.9 % (FLUSH) 0.9 %
5-40 SYRINGE (ML) INJECTION EVERY 12 HOURS SCHEDULED
Status: DISCONTINUED | OUTPATIENT
Start: 2024-09-16 | End: 2024-09-17 | Stop reason: HOSPADM

## 2024-09-16 RX ORDER — SODIUM CHLORIDE 0.9 % (FLUSH) 0.9 %
5-40 SYRINGE (ML) INJECTION PRN
Status: DISCONTINUED | OUTPATIENT
Start: 2024-09-16 | End: 2024-09-17 | Stop reason: HOSPADM

## 2024-09-16 RX ORDER — TAMSULOSIN HYDROCHLORIDE 0.4 MG/1
0.4 CAPSULE ORAL DAILY
Status: DISCONTINUED | OUTPATIENT
Start: 2024-09-16 | End: 2024-09-17 | Stop reason: HOSPADM

## 2024-09-16 RX ORDER — ROSUVASTATIN CALCIUM 20 MG/1
20 TABLET, COATED ORAL DAILY
Status: DISCONTINUED | OUTPATIENT
Start: 2024-09-16 | End: 2024-09-17 | Stop reason: HOSPADM

## 2024-09-16 RX ORDER — ASPIRIN 81 MG/1
81 TABLET, CHEWABLE ORAL DAILY
Status: DISCONTINUED | OUTPATIENT
Start: 2024-09-16 | End: 2024-09-17 | Stop reason: HOSPADM

## 2024-09-16 RX ORDER — IOPAMIDOL 755 MG/ML
INJECTION, SOLUTION INTRAVASCULAR PRN
Status: DISCONTINUED | OUTPATIENT
Start: 2024-09-16 | End: 2024-09-16 | Stop reason: HOSPADM

## 2024-09-16 RX ORDER — TAMSULOSIN HYDROCHLORIDE 0.4 MG/1
0.4 CAPSULE ORAL DAILY
COMMUNITY

## 2024-09-16 RX ORDER — SODIUM CHLORIDE 9 MG/ML
INJECTION, SOLUTION INTRAVENOUS PRN
Status: DISCONTINUED | OUTPATIENT
Start: 2024-09-16 | End: 2024-09-17 | Stop reason: HOSPADM

## 2024-09-16 RX ORDER — FENTANYL CITRATE 50 UG/ML
INJECTION, SOLUTION INTRAMUSCULAR; INTRAVENOUS PRN
Status: DISCONTINUED | OUTPATIENT
Start: 2024-09-16 | End: 2024-09-16 | Stop reason: HOSPADM

## 2024-09-16 RX ORDER — LIDOCAINE HYDROCHLORIDE 10 MG/ML
INJECTION, SOLUTION INFILTRATION; PERINEURAL PRN
Status: DISCONTINUED | OUTPATIENT
Start: 2024-09-16 | End: 2024-09-16 | Stop reason: HOSPADM

## 2024-09-16 RX ORDER — METOPROLOL SUCCINATE 25 MG/1
25 TABLET, EXTENDED RELEASE ORAL DAILY
Status: DISCONTINUED | OUTPATIENT
Start: 2024-09-16 | End: 2024-09-17 | Stop reason: HOSPADM

## 2024-09-16 RX ORDER — HEPARIN SODIUM 10000 [USP'U]/ML
INJECTION, SOLUTION INTRAVENOUS; SUBCUTANEOUS PRN
Status: DISCONTINUED | OUTPATIENT
Start: 2024-09-16 | End: 2024-09-16 | Stop reason: HOSPADM

## 2024-09-16 RX ORDER — FENOFIBRATE 160 MG/1
160 TABLET ORAL DAILY
Status: DISCONTINUED | OUTPATIENT
Start: 2024-09-16 | End: 2024-09-17 | Stop reason: HOSPADM

## 2024-09-16 RX ORDER — ASPIRIN 81 MG/1
TABLET, CHEWABLE ORAL PRN
Status: DISCONTINUED | OUTPATIENT
Start: 2024-09-16 | End: 2024-09-16 | Stop reason: HOSPADM

## 2024-09-16 RX ORDER — ACETAMINOPHEN 325 MG/1
650 TABLET ORAL EVERY 4 HOURS PRN
Status: DISCONTINUED | OUTPATIENT
Start: 2024-09-16 | End: 2024-09-17 | Stop reason: HOSPADM

## 2024-09-16 RX ADMIN — ROSUVASTATIN 20 MG: 20 TABLET, FILM COATED ORAL at 20:55

## 2024-09-16 RX ADMIN — METOPROLOL SUCCINATE 25 MG: 25 TABLET, EXTENDED RELEASE ORAL at 20:56

## 2024-09-16 RX ADMIN — TAMSULOSIN HYDROCHLORIDE 0.4 MG: 0.4 CAPSULE ORAL at 20:56

## 2024-09-16 RX ADMIN — SODIUM CHLORIDE: 9 INJECTION, SOLUTION INTRAVENOUS at 17:23

## 2024-09-16 RX ADMIN — FENOFIBRATE 160 MG: 160 TABLET ORAL at 20:55

## 2024-09-16 ASSESSMENT — PAIN - FUNCTIONAL ASSESSMENT: PAIN_FUNCTIONAL_ASSESSMENT: NONE - DENIES PAIN

## 2024-09-16 ASSESSMENT — EJECTION FRACTION: EF_VALUE: .32

## 2024-09-17 ENCOUNTER — APPOINTMENT (OUTPATIENT)
Facility: HOSPITAL | Age: 79
End: 2024-09-17
Attending: INTERNAL MEDICINE
Payer: MEDICARE

## 2024-09-17 VITALS
HEART RATE: 67 BPM | OXYGEN SATURATION: 95 % | HEIGHT: 69 IN | BODY MASS INDEX: 29.58 KG/M2 | SYSTOLIC BLOOD PRESSURE: 121 MMHG | DIASTOLIC BLOOD PRESSURE: 61 MMHG | TEMPERATURE: 97.5 F | WEIGHT: 199.74 LBS | RESPIRATION RATE: 13 BRPM

## 2024-09-17 PROBLEM — I65.23 BILATERAL CAROTID ARTERY STENOSIS: Status: ACTIVE | Noted: 2024-09-17

## 2024-09-17 PROBLEM — Z01.810 PREOP CARDIOVASCULAR EXAM: Status: ACTIVE | Noted: 2024-09-17

## 2024-09-17 LAB
ANION GAP SERPL CALC-SCNC: 5 MMOL/L (ref 2–12)
BUN SERPL-MCNC: 18 MG/DL (ref 6–20)
BUN/CREAT SERPL: 15 (ref 12–20)
CALCIUM SERPL-MCNC: 8.2 MG/DL (ref 8.5–10.1)
CHLORIDE SERPL-SCNC: 109 MMOL/L (ref 97–108)
CO2 SERPL-SCNC: 26 MMOL/L (ref 21–32)
CREAT SERPL-MCNC: 1.18 MG/DL (ref 0.7–1.3)
ECHO BSA: 2.06 M2
EST. AVERAGE GLUCOSE BLD GHB EST-MCNC: 126 MG/DL
GLUCOSE SERPL-MCNC: 117 MG/DL (ref 65–100)
HBA1C MFR BLD: 6 % (ref 4–5.6)
POTASSIUM SERPL-SCNC: 4.1 MMOL/L (ref 3.5–5.1)
SODIUM SERPL-SCNC: 140 MMOL/L (ref 136–145)
VAS LEFT CCA DIST EDV: 27.8 CM/S
VAS LEFT CCA DIST PSV: 91.6 CM/S
VAS LEFT CCA PROX EDV: 30.3 CM/S
VAS LEFT CCA PROX PSV: 99.7 CM/S
VAS LEFT ECA EDV: 19.4 CM/S
VAS LEFT ECA PSV: 121.6 CM/S
VAS LEFT ICA DIST EDV: 26.5 CM/S
VAS LEFT ICA DIST PSV: 65.8 CM/S
VAS LEFT ICA MID EDV: 23.6 CM/S
VAS LEFT ICA MID PSV: 53.4 CM/S
VAS LEFT ICA PROX EDV: 20.8 CM/S
VAS LEFT ICA PROX PSV: 40.6 CM/S
VAS LEFT ICA/CCA PSV: 0.7 NO UNITS
VAS LEFT SUBCLAVIAN PROX EDV: 0 CM/S
VAS LEFT SUBCLAVIAN PROX PSV: 133.6 CM/S
VAS LEFT VERTEBRAL EDV: 19.7 CM/S
VAS LEFT VERTEBRAL PSV: 47.6 CM/S
VAS RIGHT CCA DIST EDV: 15.5 CM/S
VAS RIGHT CCA DIST PSV: 80.6 CM/S
VAS RIGHT CCA PROX EDV: 20.4 CM/S
VAS RIGHT CCA PROX PSV: 85.5 CM/S
VAS RIGHT ECA EDV: 15.5 CM/S
VAS RIGHT ECA PSV: 83 CM/S
VAS RIGHT ICA DIST EDV: 24.1 CM/S
VAS RIGHT ICA DIST PSV: 69.5 CM/S
VAS RIGHT ICA MID EDV: 35.8 CM/S
VAS RIGHT ICA MID PSV: 85.1 CM/S
VAS RIGHT ICA PROX EDV: 20.4 CM/S
VAS RIGHT ICA PROX PSV: 46.8 CM/S
VAS RIGHT ICA/CCA PSV: 1.1 NO UNITS
VAS RIGHT SUBCLAVIAN PROX EDV: 0 CM/S
VAS RIGHT SUBCLAVIAN PROX PSV: 122.7 CM/S
VAS RIGHT VERTEBRAL EDV: 21.4 CM/S
VAS RIGHT VERTEBRAL PSV: 44 CM/S

## 2024-09-17 PROCEDURE — 6370000000 HC RX 637 (ALT 250 FOR IP): Performed by: INTERNAL MEDICINE

## 2024-09-17 PROCEDURE — 75572 CT HRT W/3D IMAGE: CPT

## 2024-09-17 PROCEDURE — 99205 OFFICE O/P NEW HI 60 MIN: CPT | Performed by: THORACIC SURGERY (CARDIOTHORACIC VASCULAR SURGERY)

## 2024-09-17 PROCEDURE — 36415 COLL VENOUS BLD VENIPUNCTURE: CPT

## 2024-09-17 PROCEDURE — G0378 HOSPITAL OBSERVATION PER HR: HCPCS

## 2024-09-17 PROCEDURE — 93880 EXTRACRANIAL BILAT STUDY: CPT

## 2024-09-17 PROCEDURE — 80048 BASIC METABOLIC PNL TOTAL CA: CPT

## 2024-09-17 PROCEDURE — 99205 OFFICE O/P NEW HI 60 MIN: CPT

## 2024-09-17 PROCEDURE — 6360000004 HC RX CONTRAST MEDICATION: Performed by: INTERNAL MEDICINE

## 2024-09-17 PROCEDURE — 93880 EXTRACRANIAL BILAT STUDY: CPT | Performed by: PSYCHIATRY & NEUROLOGY

## 2024-09-17 RX ORDER — IOPAMIDOL 755 MG/ML
100 INJECTION, SOLUTION INTRAVASCULAR
Status: COMPLETED | OUTPATIENT
Start: 2024-09-17 | End: 2024-09-17

## 2024-09-17 RX ADMIN — METOPROLOL SUCCINATE 25 MG: 25 TABLET, EXTENDED RELEASE ORAL at 09:36

## 2024-09-17 RX ADMIN — FENOFIBRATE 160 MG: 160 TABLET ORAL at 09:35

## 2024-09-17 RX ADMIN — TAMSULOSIN HYDROCHLORIDE 0.4 MG: 0.4 CAPSULE ORAL at 09:35

## 2024-09-17 RX ADMIN — ASPIRIN 81 MG: 81 TABLET, CHEWABLE ORAL at 09:35

## 2024-09-17 RX ADMIN — ROSUVASTATIN 20 MG: 20 TABLET, FILM COATED ORAL at 09:35

## 2024-09-17 RX ADMIN — IOPAMIDOL 100 ML: 755 INJECTION, SOLUTION INTRAVENOUS at 08:12

## 2024-09-18 ENCOUNTER — CLINICAL DOCUMENTATION (OUTPATIENT)
Age: 79
End: 2024-09-18

## 2024-09-26 ENCOUNTER — PREP FOR PROCEDURE (OUTPATIENT)
Age: 79
End: 2024-09-26

## 2024-09-26 RX ORDER — SODIUM CHLORIDE 9 MG/ML
INJECTION, SOLUTION INTRAVENOUS PRN
Status: CANCELLED | OUTPATIENT
Start: 2024-09-26

## 2024-09-26 RX ORDER — SODIUM CHLORIDE 0.9 % (FLUSH) 0.9 %
5-40 SYRINGE (ML) INJECTION PRN
Status: CANCELLED | OUTPATIENT
Start: 2024-09-26

## 2024-09-26 RX ORDER — SODIUM CHLORIDE 0.9 % (FLUSH) 0.9 %
5-40 SYRINGE (ML) INJECTION EVERY 12 HOURS SCHEDULED
Status: CANCELLED | OUTPATIENT
Start: 2024-09-26

## 2024-09-26 NOTE — CONSENT
Informed Consent for Blood Component Transfusion Note    I have discussed with the patient the rationale for blood component transfusion; its benefits in treating or preventing fatigue, organ damage, or death; and its risk which includes mild transfusion reactions, rare risk of blood borne infection, or more serious but rare reactions. I have discussed the alternatives to transfusion, including the risk and consequences of not receiving transfusion. The patient had an opportunity to ask questions and had agreed to proceed with transfusion of blood components.    Electronically signed by DESEAN Ho NP on 10/2/24 at 1:25 PM EDT

## 2024-09-26 NOTE — PROGRESS NOTES
Please obtain the following for PAT:  CBC with diff  CMP  Mag  PT/INR  BNP  Type and cross: Prepare 1 and keep 1 units ahead  UA with reflex to culture *only if symptomatic*  12 lead EKG  PA and lateral CXR    Thank you!    Medication instructions:     Stop Last dose   Metoprolol  10/7/24 AM     Take only ASA the AM of procedure.

## 2024-09-26 NOTE — H&P
Patient: Vernon James   Age: 79 y.o.     Patient Care Team:  None, None as PCP - General  James Alfonso MD as Consulting Physician (Cardiothoracic Surgery)  Clayton Stallings MD (Cardiothoracic Surgery)    PCP: None, None    Cardiologist: Theo    Diagnosis/Reason for Consultation: The encounter diagnosis was Abnormal cardiac valve.     HPI: 79 y.o. male with PMHx of CAD s/p PCI/Resolute KEN in the RCA and 3 overlapping Resolute KEN in the LAD (10/22/13), HTN, HLD, prediabetes, carotid stenosis s/p bilateral carotid endarectomy in 3/13, CARI s/p Inspire Implant, bilateral hernia s/p repair that is [facility] to [facility] by Dr. Venegas for interventional evaluation of severe AS.    Per patient report, about 3-4 months ago, he had a possible syncopal event precipitated by exertion. He had just cut down a tree in the yard, and he came inside, was tired, but his wife had him go up and down the stairs 3 times to retrieve various items. When he came down for the last time, he didn't feel good and just \"flopped down\" on the bed. He's not sure if he lost consciousness, but his wife, who was seen at bedside today, thinks that he did. He states that when she tried to roll him over, he lacerated his head which may have brought him to. EMS was called and he was evaluated in the ER before being sent home. No more episodes of syncope since this event. He also has been noticing decreasing energy levels over the past 6 months and \"aggressively worsening\" exertional SOB which is also occurring more frequently. He also notes that his heart beats hard with any kind of exercise. Denies CP, PND, orthopnea, and LE edema    Past medical/surgical history positive for CARI (s/p Inspire Implant), diabetes (prediabetes- follows the Mediterranean diet), and prostate disease (BPH on Flomax). Denies history of CVA/TIA, difficulty swallowing, thyroid disease, dysrhythmias (but was told a few years ago that he had some

## 2024-10-02 ENCOUNTER — HOSPITAL ENCOUNTER (OUTPATIENT)
Facility: HOSPITAL | Age: 79
Discharge: HOME | End: 2024-10-05
Payer: MEDICARE

## 2024-10-02 ENCOUNTER — CLINICAL DOCUMENTATION (OUTPATIENT)
Facility: HOSPITAL | Age: 79
End: 2024-10-02

## 2024-10-02 VITALS
BODY MASS INDEX: 29.36 KG/M2 | HEIGHT: 69 IN | TEMPERATURE: 97.7 F | DIASTOLIC BLOOD PRESSURE: 81 MMHG | HEART RATE: 72 BPM | OXYGEN SATURATION: 100 % | RESPIRATION RATE: 20 BRPM | WEIGHT: 198.19 LBS | SYSTOLIC BLOOD PRESSURE: 154 MMHG

## 2024-10-02 LAB
ABO + RH BLD: NORMAL
ALBUMIN SERPL-MCNC: 3.8 G/DL (ref 3.5–5)
ALBUMIN/GLOB SERPL: 1.1 (ref 1.1–2.2)
ALP SERPL-CCNC: 68 U/L (ref 45–117)
ALT SERPL-CCNC: 24 U/L (ref 12–78)
ANION GAP SERPL CALC-SCNC: 6 MMOL/L (ref 2–12)
AST SERPL-CCNC: 18 U/L (ref 15–37)
BASOPHILS # BLD: 0 K/UL (ref 0–0.1)
BASOPHILS NFR BLD: 1 % (ref 0–1)
BILIRUB SERPL-MCNC: 0.8 MG/DL (ref 0.2–1)
BLOOD GROUP ANTIBODIES SERPL: NORMAL
BUN SERPL-MCNC: 18 MG/DL (ref 6–20)
BUN/CREAT SERPL: 17 (ref 12–20)
CALCIUM SERPL-MCNC: 8.7 MG/DL (ref 8.5–10.1)
CHLORIDE SERPL-SCNC: 106 MMOL/L (ref 97–108)
CO2 SERPL-SCNC: 24 MMOL/L (ref 21–32)
CREAT SERPL-MCNC: 1.05 MG/DL (ref 0.7–1.3)
DIFFERENTIAL METHOD BLD: NORMAL
EOSINOPHIL # BLD: 0.1 K/UL (ref 0–0.4)
EOSINOPHIL NFR BLD: 1 % (ref 0–7)
ERYTHROCYTE [DISTWIDTH] IN BLOOD BY AUTOMATED COUNT: 12.2 % (ref 11.5–14.5)
GLOBULIN SER CALC-MCNC: 3.4 G/DL (ref 2–4)
GLUCOSE SERPL-MCNC: 105 MG/DL (ref 65–100)
HCT VFR BLD AUTO: 43 % (ref 36.6–50.3)
HGB BLD-MCNC: 14.3 G/DL (ref 12.1–17)
HISTORY CHECK: NORMAL
IMM GRANULOCYTES # BLD AUTO: 0 K/UL (ref 0–0.04)
IMM GRANULOCYTES NFR BLD AUTO: 0 % (ref 0–0.5)
INR PPP: 1 (ref 0.9–1.1)
LYMPHOCYTES # BLD: 1.4 K/UL (ref 0.8–3.5)
LYMPHOCYTES NFR BLD: 18 % (ref 12–49)
MAGNESIUM SERPL-MCNC: 1.9 MG/DL (ref 1.6–2.4)
MCH RBC QN AUTO: 30.2 PG (ref 26–34)
MCHC RBC AUTO-ENTMCNC: 33.3 G/DL (ref 30–36.5)
MCV RBC AUTO: 90.7 FL (ref 80–99)
MONOCYTES # BLD: 0.7 K/UL (ref 0–1)
MONOCYTES NFR BLD: 8 % (ref 5–13)
NEUTS SEG # BLD: 5.7 K/UL (ref 1.8–8)
NEUTS SEG NFR BLD: 72 % (ref 32–75)
NRBC # BLD: 0 K/UL (ref 0–0.01)
NRBC BLD-RTO: 0 PER 100 WBC
NT PRO BNP: 310 PG/ML
PLATELET # BLD AUTO: 206 K/UL (ref 150–400)
PMV BLD AUTO: 11.1 FL (ref 8.9–12.9)
POTASSIUM SERPL-SCNC: 3.8 MMOL/L (ref 3.5–5.1)
PROT SERPL-MCNC: 7.2 G/DL (ref 6.4–8.2)
PROTHROMBIN TIME: 10.4 SEC (ref 9–11.1)
RBC # BLD AUTO: 4.74 M/UL (ref 4.1–5.7)
SARS-COV-2 RNA RESP QL NAA+PROBE: NOT DETECTED
SODIUM SERPL-SCNC: 136 MMOL/L (ref 136–145)
SOURCE: NORMAL
SPECIMEN EXP DATE BLD: NORMAL
WBC # BLD AUTO: 7.8 K/UL (ref 4.1–11.1)

## 2024-10-02 PROCEDURE — 71046 X-RAY EXAM CHEST 2 VIEWS: CPT

## 2024-10-02 PROCEDURE — 86900 BLOOD TYPING SEROLOGIC ABO: CPT

## 2024-10-02 PROCEDURE — 93005 ELECTROCARDIOGRAM TRACING: CPT | Performed by: INTERNAL MEDICINE

## 2024-10-02 PROCEDURE — 87635 SARS-COV-2 COVID-19 AMP PRB: CPT

## 2024-10-02 PROCEDURE — 83880 ASSAY OF NATRIURETIC PEPTIDE: CPT

## 2024-10-02 PROCEDURE — 80053 COMPREHEN METABOLIC PANEL: CPT

## 2024-10-02 PROCEDURE — 36415 COLL VENOUS BLD VENIPUNCTURE: CPT

## 2024-10-02 PROCEDURE — 85610 PROTHROMBIN TIME: CPT

## 2024-10-02 PROCEDURE — 86901 BLOOD TYPING SEROLOGIC RH(D): CPT

## 2024-10-02 PROCEDURE — 83735 ASSAY OF MAGNESIUM: CPT

## 2024-10-02 PROCEDURE — 85025 COMPLETE CBC W/AUTO DIFF WBC: CPT

## 2024-10-02 PROCEDURE — 86850 RBC ANTIBODY SCREEN: CPT

## 2024-10-02 RX ORDER — SODIUM CHLORIDE, SODIUM LACTATE, POTASSIUM CHLORIDE, CALCIUM CHLORIDE 600; 310; 30; 20 MG/100ML; MG/100ML; MG/100ML; MG/100ML
INJECTION, SOLUTION INTRAVENOUS CONTINUOUS
OUTPATIENT
Start: 2024-10-09

## 2024-10-02 NOTE — PROGRESS NOTES
Cardiac Surgery Care Coordinator-  Met with Vernon James in PAT. Introduced role of the Cardiac Surgery Care Coordinator.  Reviewed plan of care and began pre-op education.  Discussed day of surgery expectations for the pt and family.  Provided TAVR educational folder and reviewed content, including Cardiac Surgery Pathway. Encouraged Vernon James to verbalize and offered emotional support. Monae Marquez RN

## 2024-10-02 NOTE — PERIOP NOTE
[facility]  Preoperative Instructions        Surgery Date Wednesday, October 9th              On the day of your surgery, please report to the main entrance of the hospital ([facility] side). Go up to the gold elevators and report to the 2nd floor registration. This is where you go for the cath lab.     2. Do not have anything to eat or drink (including water, gum, mints, coffee, juice) after midnight the night prior to surgery.?This may not apply to medications prescribed by your physician. ?(Please note below the special instructions with medications to take the morning of your procedure.)    4. We recommend you do not drink any alcoholic beverages 1 week prior to your surgery. Also, no smoking 1 week prior to surgery.    5. Your surgeon would like vitamins/supplements stopped 1 week prior to procedure.    6. Wear comfortable clothes.  Wear glasses instead of contacts.  Do not bring any money or jewelry. Please bring picture ID, insurance card, and any prearranged co-payment or hospital payment.  Do not wear make-up, particularly mascara the morning of your surgery.  Do not wear nail polish, particularly if you are having foot /hand surgery.  Wear your hair loose or down, no ponytails, buns, delfino pins or clips.  All body piercings must be removed.  Please shower with antibacterial soap for three consecutive days before and on the morning of surgery, but do not apply any lotions, powders or deodorants after the shower on the day of surgery. Please use a fresh towels after each shower. Please sleep in clean clothes and change bed linens the night before surgery.  Please do not shave for 48 hours prior to surgery. Shaving of the face is acceptable    7. You should understand that if you do not follow these instructions your surgery may be cancelled.  If your physical condition changes (I.e. fever, cold or flu) please contact your surgeon as soon as possible.    8. It is important that you be on

## 2024-10-02 NOTE — PERIOP NOTE

## 2024-10-02 NOTE — PERIOP NOTE

## 2024-10-04 LAB
EKG ATRIAL RATE: 81 BPM
EKG DIAGNOSIS: NORMAL
EKG P AXIS: 54 DEGREES
EKG P-R INTERVAL: 184 MS
EKG Q-T INTERVAL: 392 MS
EKG QRS DURATION: 78 MS
EKG QTC CALCULATION (BAZETT): 455 MS
EKG R AXIS: 40 DEGREES
EKG T AXIS: 93 DEGREES
EKG VENTRICULAR RATE: 81 BPM

## 2024-10-08 ENCOUNTER — TELEPHONE (OUTPATIENT)
Age: 79
End: 2024-10-08

## 2024-10-08 ENCOUNTER — ANESTHESIA EVENT (OUTPATIENT)
Facility: HOSPITAL | Age: 79
DRG: 267 | End: 2024-10-08
Payer: MEDICARE

## 2024-10-08 NOTE — TELEPHONE ENCOUNTER
RN spoke with patient regarding sneezing and coughing after mowing 4 acres of grass and weeds. States he does not have any fever/chills and feels fine otherwise.     RN instructed patient to take OTC allergy medicine and Flonase (Per Aliza) and we speak with IC to ensure he is okay to come  tomorrow    NP Aliza notified

## 2024-10-09 ENCOUNTER — APPOINTMENT (OUTPATIENT)
Facility: HOSPITAL | Age: 79
DRG: 267 | End: 2024-10-09
Attending: INTERNAL MEDICINE
Payer: MEDICARE

## 2024-10-09 ENCOUNTER — HOSPITAL ENCOUNTER (INPATIENT)
Facility: HOSPITAL | Age: 79
LOS: 1 days | Discharge: HOME | DRG: 267 | End: 2024-10-10
Attending: INTERNAL MEDICINE | Admitting: INTERNAL MEDICINE
Payer: MEDICARE

## 2024-10-09 ENCOUNTER — ANESTHESIA (OUTPATIENT)
Facility: HOSPITAL | Age: 79
DRG: 267 | End: 2024-10-09
Payer: MEDICARE

## 2024-10-09 DIAGNOSIS — Z95.2 S/P TAVR (TRANSCATHETER AORTIC VALVE REPLACEMENT): ICD-10-CM

## 2024-10-09 DIAGNOSIS — Q24.8 ABNORMAL CARDIAC VALVE: ICD-10-CM

## 2024-10-09 DIAGNOSIS — Z95.2 S/P TAVR (TRANSCATHETER AORTIC VALVE REPLACEMENT): Primary | ICD-10-CM

## 2024-10-09 DIAGNOSIS — I35.0 AORTIC STENOSIS: Primary | ICD-10-CM

## 2024-10-09 LAB
ABO + RH BLD: NORMAL
BASOPHILS # BLD: 0 K/UL (ref 0–0.1)
BASOPHILS NFR BLD: 1 % (ref 0–1)
BLD PROD TYP BPU: NORMAL
BLOOD BANK DISPENSE STATUS: NORMAL
BLOOD GROUP ANTIBODIES SERPL: NORMAL
BPU ID: NORMAL
CROSSMATCH RESULT: NORMAL
DIFFERENTIAL METHOD BLD: ABNORMAL
ECHO AV AREA PEAK VELOCITY: 1.8 CM2
ECHO AV AREA PEAK VELOCITY: 1.9 CM2
ECHO AV AREA VTI: 1.8 CM2
ECHO AV AREA/BSA VTI: 0.9 CM2/M2
ECHO AV MEAN GRADIENT: 7 MMHG
ECHO AV MEAN VELOCITY: 1.2 M/S
ECHO AV PEAK GRADIENT: 11 MMHG
ECHO AV PEAK GRADIENT: 11 MMHG
ECHO AV PEAK VELOCITY: 1.6 M/S
ECHO AV PEAK VELOCITY: 1.7 M/S
ECHO AV VTI: 31.1 CM
ECHO BSA: 2.06 M2
ECHO LA DIAMETER INDEX: 1.82 CM/M2
ECHO LA DIAMETER: 3.7 CM
ECHO LV E' LATERAL VELOCITY: 4.1 CM/S
ECHO LV E' SEPTAL VELOCITY: 3.8 CM/S
ECHO LV EF PHYSICIAN: 55 %
ECHO LV EF PHYSICIAN: 55 %
ECHO LV FRACTIONAL SHORTENING: 30 % (ref 28–44)
ECHO LV INTERNAL DIMENSION DIASTOLE INDEX: 2.12 CM/M2
ECHO LV INTERNAL DIMENSION DIASTOLIC: 4.3 CM (ref 4.2–5.9)
ECHO LV INTERNAL DIMENSION SYSTOLIC INDEX: 1.48 CM/M2
ECHO LV INTERNAL DIMENSION SYSTOLIC: 3 CM
ECHO LV IVSD: 1.5 CM (ref 0.6–1)
ECHO LV MASS 2D: 258.1 G (ref 88–224)
ECHO LV MASS INDEX 2D: 127.1 G/M2 (ref 49–115)
ECHO LV POSTERIOR WALL DIASTOLIC: 1.5 CM (ref 0.6–1)
ECHO LV RELATIVE WALL THICKNESS RATIO: 0.7
ECHO LVOT AREA: 3.1 CM2
ECHO LVOT AV VTI INDEX: 0.57
ECHO LVOT DIAM: 2 CM
ECHO LVOT MEAN GRADIENT: 2 MMHG
ECHO LVOT PEAK GRADIENT: 4 MMHG
ECHO LVOT PEAK GRADIENT: 4 MMHG
ECHO LVOT PEAK VELOCITY: 0.9 M/S
ECHO LVOT PEAK VELOCITY: 1 M/S
ECHO LVOT STROKE VOLUME INDEX: 27.5 ML/M2
ECHO LVOT SV: 55.9 ML
ECHO LVOT VTI: 17.8 CM
ECHO MV A VELOCITY: 0.84 M/S
ECHO MV AREA VTI: 2.4 CM2
ECHO MV E DECELERATION TIME (DT): 230.4 MS
ECHO MV E VELOCITY: 0.55 M/S
ECHO MV E/A RATIO: 0.65
ECHO MV E/E' LATERAL: 13.41
ECHO MV E/E' RATIO (AVERAGED): 13.94
ECHO MV E/E' SEPTAL: 14.47
ECHO MV LVOT VTI INDEX: 1.33
ECHO MV MAX VELOCITY: 1 M/S
ECHO MV MEAN GRADIENT: 2 MMHG
ECHO MV MEAN VELOCITY: 0.6 M/S
ECHO MV PEAK GRADIENT: 4 MMHG
ECHO MV VTI: 23.7 CM
ECHO PV MAX VELOCITY: 0.7 M/S
ECHO PV PEAK GRADIENT: 2 MMHG
ECHO RV FREE WALL PEAK S': 14.8 CM/S
ECHO RV INTERNAL DIMENSION: 3.8 CM
EOSINOPHIL # BLD: 0.2 K/UL (ref 0–0.4)
EOSINOPHIL NFR BLD: 2 % (ref 0–7)
ERYTHROCYTE [DISTWIDTH] IN BLOOD BY AUTOMATED COUNT: 12.7 % (ref 11.5–14.5)
HCT VFR BLD AUTO: 41.4 % (ref 36.6–50.3)
HGB BLD-MCNC: 13.4 G/DL (ref 12.1–17)
IMM GRANULOCYTES # BLD AUTO: 0.1 K/UL (ref 0–0.04)
IMM GRANULOCYTES NFR BLD AUTO: 1 % (ref 0–0.5)
LYMPHOCYTES # BLD: 1.2 K/UL (ref 0.8–3.5)
LYMPHOCYTES NFR BLD: 16 % (ref 12–49)
MCH RBC QN AUTO: 29.6 PG (ref 26–34)
MCHC RBC AUTO-ENTMCNC: 32.4 G/DL (ref 30–36.5)
MCV RBC AUTO: 91.4 FL (ref 80–99)
MONOCYTES # BLD: 0.6 K/UL (ref 0–1)
MONOCYTES NFR BLD: 8 % (ref 5–13)
NEUTS SEG # BLD: 5.2 K/UL (ref 1.8–8)
NEUTS SEG NFR BLD: 72 % (ref 32–75)
NRBC # BLD: 0 K/UL (ref 0–0.01)
NRBC BLD-RTO: 0 PER 100 WBC
PLATELET # BLD AUTO: 168 K/UL (ref 150–400)
PMV BLD AUTO: 10.9 FL (ref 8.9–12.9)
RBC # BLD AUTO: 4.53 M/UL (ref 4.1–5.7)
SPECIMEN EXP DATE BLD: NORMAL
UNIT DIVISION: 0
WBC # BLD AUTO: 7.3 K/UL (ref 4.1–11.1)

## 2024-10-09 PROCEDURE — 6360000002 HC RX W HCPCS

## 2024-10-09 PROCEDURE — 33210 INSERT ELECTRD/PM CATH SNGL: CPT | Performed by: INTERNAL MEDICINE

## 2024-10-09 PROCEDURE — 2709999900 HC NON-CHARGEABLE SUPPLY: Performed by: INTERNAL MEDICINE

## 2024-10-09 PROCEDURE — 3700000000 HC ANESTHESIA ATTENDED CARE: Performed by: INTERNAL MEDICINE

## 2024-10-09 PROCEDURE — 2580000003 HC RX 258

## 2024-10-09 PROCEDURE — 6360000002 HC RX W HCPCS: Performed by: NURSE ANESTHETIST, CERTIFIED REGISTERED

## 2024-10-09 PROCEDURE — 2060000000 HC ICU INTERMEDIATE R&B

## 2024-10-09 PROCEDURE — 4A023N7 MEASUREMENT OF CARDIAC SAMPLING AND PRESSURE, LEFT HEART, PERCUTANEOUS APPROACH: ICD-10-PCS | Performed by: THORACIC SURGERY (CARDIOTHORACIC VASCULAR SURGERY)

## 2024-10-09 PROCEDURE — 02RF38Z REPLACEMENT OF AORTIC VALVE WITH ZOOPLASTIC TISSUE, PERCUTANEOUS APPROACH: ICD-10-PCS | Performed by: THORACIC SURGERY (CARDIOTHORACIC VASCULAR SURGERY)

## 2024-10-09 PROCEDURE — 85025 COMPLETE CBC W/AUTO DIFF WBC: CPT

## 2024-10-09 PROCEDURE — 93321 DOPPLER ECHO F-UP/LMTD STD: CPT

## 2024-10-09 PROCEDURE — 6370000000 HC RX 637 (ALT 250 FOR IP)

## 2024-10-09 PROCEDURE — C1769 GUIDE WIRE: HCPCS | Performed by: INTERNAL MEDICINE

## 2024-10-09 PROCEDURE — 36415 COLL VENOUS BLD VENIPUNCTURE: CPT

## 2024-10-09 PROCEDURE — 2500000003 HC RX 250 WO HCPCS: Performed by: INTERNAL MEDICINE

## 2024-10-09 PROCEDURE — 6360000004 HC RX CONTRAST MEDICATION: Performed by: INTERNAL MEDICINE

## 2024-10-09 PROCEDURE — 85347 COAGULATION TIME ACTIVATED: CPT

## 2024-10-09 PROCEDURE — 3700000001 HC ADD 15 MINUTES (ANESTHESIA): Performed by: INTERNAL MEDICINE

## 2024-10-09 PROCEDURE — 2580000003 HC RX 258: Performed by: NURSE ANESTHETIST, CERTIFIED REGISTERED

## 2024-10-09 PROCEDURE — 2500000003 HC RX 250 WO HCPCS: Performed by: NURSE ANESTHETIST, CERTIFIED REGISTERED

## 2024-10-09 PROCEDURE — 93306 TTE W/DOPPLER COMPLETE: CPT

## 2024-10-09 PROCEDURE — C1894 INTRO/SHEATH, NON-LASER: HCPCS | Performed by: INTERNAL MEDICINE

## 2024-10-09 PROCEDURE — C1760 CLOSURE DEV, VASC: HCPCS | Performed by: INTERNAL MEDICINE

## 2024-10-09 RX ORDER — FENTANYL CITRATE 50 UG/ML
INJECTION, SOLUTION INTRAMUSCULAR; INTRAVENOUS
Status: DISCONTINUED | OUTPATIENT
Start: 2024-10-09 | End: 2024-10-09 | Stop reason: SDUPTHER

## 2024-10-09 RX ORDER — SODIUM CHLORIDE 0.9 % (FLUSH) 0.9 %
5-40 SYRINGE (ML) INJECTION PRN
Status: DISCONTINUED | OUTPATIENT
Start: 2024-10-09 | End: 2024-10-09 | Stop reason: HOSPADM

## 2024-10-09 RX ORDER — SODIUM CHLORIDE 9 MG/ML
INJECTION, SOLUTION INTRAVENOUS PRN
Status: DISCONTINUED | OUTPATIENT
Start: 2024-10-09 | End: 2024-10-09 | Stop reason: HOSPADM

## 2024-10-09 RX ORDER — ONDANSETRON 2 MG/ML
INJECTION INTRAMUSCULAR; INTRAVENOUS
Status: DISCONTINUED | OUTPATIENT
Start: 2024-10-09 | End: 2024-10-09 | Stop reason: SDUPTHER

## 2024-10-09 RX ORDER — ACETAMINOPHEN 325 MG/1
650 TABLET ORAL EVERY 6 HOURS PRN
Status: SHIPPED | COMMUNITY
Start: 2024-10-09

## 2024-10-09 RX ORDER — PROTAMINE SULFATE 10 MG/ML
INJECTION, SOLUTION INTRAVENOUS
Status: DISCONTINUED | OUTPATIENT
Start: 2024-10-09 | End: 2024-10-09 | Stop reason: SDUPTHER

## 2024-10-09 RX ORDER — HEPARIN SODIUM 1000 [USP'U]/ML
INJECTION, SOLUTION INTRAVENOUS; SUBCUTANEOUS
Status: DISCONTINUED | OUTPATIENT
Start: 2024-10-09 | End: 2024-10-09 | Stop reason: SDUPTHER

## 2024-10-09 RX ORDER — LIDOCAINE HYDROCHLORIDE AND EPINEPHRINE 10; 10 MG/ML; UG/ML
INJECTION, SOLUTION INFILTRATION; PERINEURAL PRN
Status: DISCONTINUED | OUTPATIENT
Start: 2024-10-09 | End: 2024-10-09 | Stop reason: HOSPADM

## 2024-10-09 RX ORDER — FENOFIBRATE 54 MG/1
54 TABLET ORAL DAILY
Status: DISCONTINUED | OUTPATIENT
Start: 2024-10-09 | End: 2024-10-10 | Stop reason: HOSPADM

## 2024-10-09 RX ORDER — SODIUM CHLORIDE 0.9 % (FLUSH) 0.9 %
5-40 SYRINGE (ML) INJECTION EVERY 12 HOURS SCHEDULED
Status: DISCONTINUED | OUTPATIENT
Start: 2024-10-09 | End: 2024-10-09 | Stop reason: HOSPADM

## 2024-10-09 RX ORDER — TAMSULOSIN HYDROCHLORIDE 0.4 MG/1
0.4 CAPSULE ORAL DAILY
Status: DISCONTINUED | OUTPATIENT
Start: 2024-10-09 | End: 2024-10-10 | Stop reason: HOSPADM

## 2024-10-09 RX ORDER — ACETAMINOPHEN 325 MG/1
650 TABLET ORAL EVERY 4 HOURS PRN
Status: DISCONTINUED | OUTPATIENT
Start: 2024-10-09 | End: 2024-10-10 | Stop reason: HOSPADM

## 2024-10-09 RX ORDER — SODIUM CHLORIDE 0.9 % (FLUSH) 0.9 %
5-40 SYRINGE (ML) INJECTION EVERY 12 HOURS SCHEDULED
Status: DISCONTINUED | OUTPATIENT
Start: 2024-10-09 | End: 2024-10-10 | Stop reason: HOSPADM

## 2024-10-09 RX ORDER — ROSUVASTATIN CALCIUM 20 MG/1
20 TABLET, COATED ORAL DAILY
Status: DISCONTINUED | OUTPATIENT
Start: 2024-10-09 | End: 2024-10-10 | Stop reason: HOSPADM

## 2024-10-09 RX ORDER — SODIUM CHLORIDE 0.9 % (FLUSH) 0.9 %
5-40 SYRINGE (ML) INJECTION PRN
Status: DISCONTINUED | OUTPATIENT
Start: 2024-10-09 | End: 2024-10-10 | Stop reason: HOSPADM

## 2024-10-09 RX ORDER — SODIUM CHLORIDE, SODIUM LACTATE, POTASSIUM CHLORIDE, CALCIUM CHLORIDE 600; 310; 30; 20 MG/100ML; MG/100ML; MG/100ML; MG/100ML
INJECTION, SOLUTION INTRAVENOUS CONTINUOUS
Status: DISCONTINUED | OUTPATIENT
Start: 2024-10-09 | End: 2024-10-09 | Stop reason: HOSPADM

## 2024-10-09 RX ORDER — PHENYLEPHRINE HCL IN 0.9% NACL 0.4MG/10ML
SYRINGE (ML) INTRAVENOUS
Status: DISCONTINUED | OUTPATIENT
Start: 2024-10-09 | End: 2024-10-09 | Stop reason: SDUPTHER

## 2024-10-09 RX ORDER — IOPAMIDOL 755 MG/ML
INJECTION, SOLUTION INTRAVASCULAR PRN
Status: DISCONTINUED | OUTPATIENT
Start: 2024-10-09 | End: 2024-10-09 | Stop reason: HOSPADM

## 2024-10-09 RX ORDER — ONDANSETRON 2 MG/ML
4 INJECTION INTRAMUSCULAR; INTRAVENOUS EVERY 6 HOURS PRN
Status: DISCONTINUED | OUTPATIENT
Start: 2024-10-09 | End: 2024-10-10 | Stop reason: HOSPADM

## 2024-10-09 RX ORDER — LIDOCAINE HYDROCHLORIDE 10 MG/ML
INJECTION, SOLUTION INFILTRATION; PERINEURAL PRN
Status: DISCONTINUED | OUTPATIENT
Start: 2024-10-09 | End: 2024-10-09 | Stop reason: HOSPADM

## 2024-10-09 RX ORDER — SODIUM CHLORIDE 9 MG/ML
INJECTION, SOLUTION INTRAVENOUS PRN
Status: DISCONTINUED | OUTPATIENT
Start: 2024-10-09 | End: 2024-10-10 | Stop reason: HOSPADM

## 2024-10-09 RX ORDER — ONDANSETRON 4 MG/1
4 TABLET, ORALLY DISINTEGRATING ORAL EVERY 8 HOURS PRN
Status: DISCONTINUED | OUTPATIENT
Start: 2024-10-09 | End: 2024-10-10 | Stop reason: HOSPADM

## 2024-10-09 RX ORDER — ASPIRIN 81 MG/1
81 TABLET ORAL DAILY
Status: DISCONTINUED | OUTPATIENT
Start: 2024-10-10 | End: 2024-10-10 | Stop reason: HOSPADM

## 2024-10-09 RX ADMIN — ROSUVASTATIN CALCIUM 20 MG: 20 TABLET, FILM COATED ORAL at 12:26

## 2024-10-09 RX ADMIN — PROPOFOL 50 MG: 10 INJECTION, EMULSION INTRAVENOUS at 08:02

## 2024-10-09 RX ADMIN — SODIUM CHLORIDE, PRESERVATIVE FREE 10 ML: 5 INJECTION INTRAVENOUS at 12:28

## 2024-10-09 RX ADMIN — TAMSULOSIN HYDROCHLORIDE 0.4 MG: 0.4 CAPSULE ORAL at 12:26

## 2024-10-09 RX ADMIN — PROTAMINE SULFATE 90 MG: 10 INJECTION, SOLUTION INTRAVENOUS at 09:21

## 2024-10-09 RX ADMIN — PHENYLEPHRINE HYDROCHLORIDE 20 MCG/MIN: 10 INJECTION INTRAVENOUS at 08:20

## 2024-10-09 RX ADMIN — PROPOFOL 75 MCG/KG/MIN: 10 INJECTION, EMULSION INTRAVENOUS at 07:48

## 2024-10-09 RX ADMIN — LIDOCAINE HYDROCHLORIDE 50 MG: 20 INJECTION, SOLUTION EPIDURAL; INFILTRATION; INTRACAUDAL; PERINEURAL at 07:58

## 2024-10-09 RX ADMIN — ONDANSETRON HYDROCHLORIDE 4 MG: 2 INJECTION, SOLUTION INTRAMUSCULAR; INTRAVENOUS at 09:36

## 2024-10-09 RX ADMIN — HEPARIN SODIUM 11000 UNITS: 1000 INJECTION, SOLUTION INTRAVENOUS; SUBCUTANEOUS at 08:52

## 2024-10-09 RX ADMIN — ACETAMINOPHEN 650 MG: 325 TABLET ORAL at 20:58

## 2024-10-09 RX ADMIN — SODIUM CHLORIDE: 9 INJECTION, SOLUTION INTRAVENOUS at 07:32

## 2024-10-09 RX ADMIN — Medication 200 MCG: at 08:37

## 2024-10-09 RX ADMIN — WATER 2000 MG: 1 INJECTION INTRAMUSCULAR; INTRAVENOUS; SUBCUTANEOUS at 08:01

## 2024-10-09 RX ADMIN — FENOFIBRATE 54 MG: 54 TABLET ORAL at 12:26

## 2024-10-09 RX ADMIN — FENTANYL CITRATE 100 MCG: 50 INJECTION, SOLUTION INTRAMUSCULAR; INTRAVENOUS at 07:23

## 2024-10-09 RX ADMIN — PROPOFOL 50 MG: 10 INJECTION, EMULSION INTRAVENOUS at 07:58

## 2024-10-09 ASSESSMENT — EJECTION FRACTION
EF_VALUE: .37
EF_VALUE: .31

## 2024-10-09 ASSESSMENT — PAIN SCALES - GENERAL: PAINLEVEL_OUTOF10: 3

## 2024-10-09 ASSESSMENT — PAIN DESCRIPTION - DESCRIPTORS: DESCRIPTORS: ACHING;DULL

## 2024-10-09 ASSESSMENT — PAIN DESCRIPTION - ORIENTATION: ORIENTATION: MID

## 2024-10-09 ASSESSMENT — PAIN DESCRIPTION - LOCATION: LOCATION: HEAD

## 2024-10-09 NOTE — PROGRESS NOTES
TRANSFER - IN REPORT:    Verbal report received from Antonia on Vernon James  being received from [facility] for routine progression of care. Report consisted of patient’s Situation, Background, Assessment and Recommendations(SBAR).  Opportunity for questions and clarification was provided. Assessment completed upon patient’s arrival to IVCU and care assumed.       PROCEDURE SITE CHECK:    Procedure site: R and L groin sites. Patient currently has no c/o pain/discomfort reported at procedure site. No bruising, bleeding, or hematoma. Patient denies Numbness or tingling to BLE.

## 2024-10-09 NOTE — ANESTHESIA PRE PROCEDURE
Department of Anesthesiology  Preprocedure Note       Name:  Vernon James   Age:  79 y.o.  :  1945                                          MRN:  [id number]         Date:  10/9/2024      Surgeon: Surgeon(s):  James Alfonso MD Wehman, Paul B, MD    Procedure: Procedure(s):  Transcatheter aortic valve replacement  Transcatheter aortic valve replacement    Medications prior to admission:   Prior to Admission medications    Medication Sig Start Date End Date Taking? Authorizing Provider   tamsulosin (FLOMAX) 0.4 MG capsule Take 1 capsule by mouth daily   Yes Janette Ann MD   metoprolol succinate (TOPROL XL) 25 MG extended release tablet Take 1 tablet by mouth daily   Yes Jantete Ann MD   fenofibrate (TRICOR) 54 MG tablet Take 1 tablet by mouth daily s [facility] pharmacy: Please dispense generic fenofibrate unless prescriber denote   Yes Janette Ann MD   rosuvastatin (CRESTOR) 20 MG tablet Take 1 tablet by mouth daily   Yes Janette Ann MD   aspirin 81 MG chewable tablet Take 1 tablet by mouth daily   Yes Janette Ann MD       Current medications:    No current facility-administered medications for this encounter.       Allergies:  No Known Allergies    Problem List:    Patient Active Problem List   Diagnosis Code   • Inflamed skin tag L91.8   • Basal cell carcinoma, eyelid, right C44.111   • Basal cell carcinoma (BCC) of forehead C44.319   • Melanoma in situ of cheek (HCC) D03.39   • Aortic stenosis, severe I35.0   • Bilateral carotid artery stenosis I65.23   • Preop cardiovascular exam Z01.810       Past Medical History:        Diagnosis Date   • Aortic valve stenosis    • CAD (coronary artery disease)     stents   • Hyperlipidemia    • Hypertension    • Melanoma (HCC)     face & arms   • CARI (obstructive sleep apnea)     Inspire implant       Past Surgical History:        Procedure Laterality Date   • CARDIAC PROCEDURE N/A 2024    Left heart cath /

## 2024-10-09 NOTE — ANESTHESIA PROCEDURE NOTES
Arterial Line:    An arterial line was placed using surface landmarks, in the pre-op for the following indication(s): continuous blood pressure monitoring and blood sampling needed.    A 20 gauge (size), 4.45 cm (length), Arrow (type) catheter was placed, Seldinger technique not used, into the left radial artery, secured by Tegaderm.  Anesthesia type: Local  Local infiltration: Injection    Events:  patient tolerated procedure well with no complications and EBL 0mL.10/9/2024 7:23 AM10/9/2024 7:25 AM  Other anesthesia staff: Gwyn Thapa RN  Performed: Other anesthesia staff   Preanesthetic Checklist  Completed: patient identified, IV checked, site marked, risks and benefits discussed, surgical/procedural consents, equipment checked, pre-op evaluation, anesthesia consent given, oxygen available and monitors applied/VS acknowledged

## 2024-10-09 NOTE — PROGRESS NOTES
Nutrition Education    Educated on Heart healthy diet  Learners: Patient  Readiness: Eager  Method: Explanation, Handout, and Teachback  Response: Demonstrated Understanding  Contact name and number provided.    Consult received per patient request for diet education. Patient with multiple questions regarding a heart healthy diet. Provided patient with handouts and discussed heart healthy fats/carbs as was well as low Na diet. Patient very motivated to make changes at home. States he has 22 grandchildren he wants to live a long life for. RD available by consult if patient has further questions. Thanks.     Kimberly Dowd RD  Contact Number: ext [phone]

## 2024-10-09 NOTE — CARDIO/PULMONARY
Chart reviewed: Patient is 79 y.o. male admitted with Abnormal cardiac valve [Q24.8]  Aortic stenosis, severe [I35.0]    Education: Trans-catheter education folder at the bedside.  Met with Vernon James.     Educated using teach back method. Reviewed daily weights and temperatures, signs and symptoms of infection at surgery sites, valve type and card, and use of incentive spirometer.  Smoking history assessed. Patient is not a smoker. Encouraged Heart Healthy, Low Sodium (2000 mg) diet.      Discussed Cardiac Rehab Program benefits, format, and encouraged participation. Pt would appreciate a follow up call to schedule as he is very involved with his grandchildren's activities and is unsure at the moment when he can come in. Informed pt we will follow up via phone call.    General questions answered. Vernon James verbalized understanding.     OSBALDO PALMA RN

## 2024-10-09 NOTE — H&P
Update History & Physical    The patient's History and Physical of October 2, 2024 was reviewed with the patient and I examined the patient. There was no change. The surgical site was confirmed by the patient and me.       Plan: The risks, benefits, expected outcome, and alternative to the recommended procedure have been discussed with the patient. Patient understands and wants to proceed with the procedure.     Electronically signed by DESEAN Ho NP on 10/9/2024 at 7:31 AM

## 2024-10-09 NOTE — OP NOTE
Operative Note      Transcatheter Aortic Valve Replacement Percutaneously through the Femoral Artery     Indication  The valve was placed for severe aortic stenosis. Intermediate risk.     Interventional cardiologist: James Alfonso MD  Cardiac surgeon: Timbo Stallings MD.     Access  The valve was placed using a transfemoral approach.   Initially a 6Fr sheath was placed in the RFA, 6Fr long sheath in LFV, 6Fr sheath in the LFA using ultrasound and micropuncture technique.  The LFV was used to place temporary venous wire.  The LFA was used to place a pig tail for angiography.  The RFA was preclosed with 2 perclose sutures.   Then delivery sheath was placed and patient heparinized.     Procedure     Prior to placement of the aortic valve, an aortogram were performed.  Left heart catheterization was peformed.  A balloon aortic valvuloplasty was not performed prior to valve placement.  A 26 mm Lees Vivienne S3 Resilia aortic valve was then deployed during rapid ventricular pacing. Post-deployment balloon inflation was not performed.  Cardiopulmonary bypass support was not used for hemodynamic support during the procedure.     At the completion of the case, catheters were removed. Protamine was given. The valve delivery sheath was removed and hemostasis obtained by 2 perclose suture.  6Fr LFA hemostasis via external pressure. Other venous sheaths were removed and hemostasis obtained by manual compression for 15 minutes.     Hemodynamics     Pre- deplyment hemodynamics showed severe aortic stenosis with mean gradient of 43 mm Hg, with LVEDP of 10 mm Hg.  Post-deployment hemodynamics showed transvalvular mean gradient of 2 mm Hg, with LVEDP of 18 mm Hg.     Angiography  1. Aortic angiography pre intervention demonstrates severe AS and mild AI.  2. Aortic angiography post TAVR demonstrates the valve in good position with no AI with patent coronary arteries.  3. Abdominal aortography showed patent vessels with patent RFA

## 2024-10-09 NOTE — PROGRESS NOTES
Cardiac Cath Lab Recovery Arrival Note:      Vernon James arrived to Cardiac Cath Lab, Recovery Area. Staff introduced to patient. Patient identifiers verified with NAME and DATE OF BIRTH. Procedure verified with patient. Consent forms reviewed and signed by patient or authorized representative and verified. Allergies verified.     Patient and family oriented to department. Patient and family informed of procedure and plan of care.     Questions answered with review. Patient prepped for procedure, per orders from physician, prior to arrival.    Patient on cardiac monitor, non-invasive blood pressure, SPO2 monitor. On RA. Patient is A&Ox 4. Patient reports no pain.     Patient in stretcher, in low position, with side rails up, call bell within reach, patient instructed to call if assistance as needed.    Patient prep in: [facility] Recovery Area, [place] [address].     Family in: Hannah, wife.   Prep by: Linette

## 2024-10-09 NOTE — PROGRESS NOTES
Clinical Update:    DanielONEAL James seen on IVCU s/p 26 mm Lees Vivienne S3 Resilia  with Dr. Stallings and with Dr. Alfonso under MAC anesthesia on 10/9/24.      Patient is awake and alert . Access sites soft with gauze dressing-clean, dry, intact. PPP.     Anticoagulation plan for ASA. Plan for postoperative TTE and EKG. Holding BB for now- may resume at half-dose later today if HR and BP uptrend.     Vitals:    10/09/24 1021   BP: (!) 145/73   Pulse: 68   Resp: 14   Temp:    SpO2: 99%           Signed:  DESEAN Ho NP  10/9/2024  10:43 AM         bloody stools

## 2024-10-09 NOTE — ANESTHESIA POSTPROCEDURE EVALUATION
Department of Anesthesiology  Postprocedure Note    Patient: Vernon James  MRN: [id number]  YOB: 1945  Date of evaluation: 10/9/2024    Procedure Summary       Date: 10/09/24 Room / Location: [facility] CATH LAB 1 / [facility] CARDIAC CATH LAB    Anesthesia Start: 0746 Anesthesia Stop: 0953    Procedures:       Transcatheter aortic valve replacement      Aortic root aortogram      Insert temporary pacemaker      Aortagram abdominal w runoff      Ultrasound guided vascular access      Transcatheter aortic valve replacement Diagnosis: Abnormal cardiac valve    Providers: James Alfonso MD; Clayton Stallings MD Responsible Provider: Milton Loya MD    Anesthesia Type: MAC ASA Status: 4            Anesthesia Type: MAC    Chandana Phase I: Chandana Score: 10    Chandana Phase II:      Anesthesia Post Evaluation  Post-Anesthesia Evaluation and Assessment    Patient: Vernon James MRN: [id number]  SSN: [id number]    YOB: 1945  Age: 79 y.o.  Sex: male      I have evaluated the patient and they are stable and ready for discharge from the PACU.     Cardiovascular Function/Vital Signs  Visit Vitals  BP (!) 146/74   Pulse 79   Temp 97.6 °F (36.4 °C) (Oral)   Resp 23   Ht 1.753 m (5' 9.02\")   Wt 87.5 kg (193 lb)   SpO2 97%   BMI 28.49 kg/m²       Patient is status post Monitor Anesthesia Care anesthesia for Procedure(s):  Transcatheter aortic valve replacement  Transcatheter aortic valve replacement  Aortic root aortogram  Insert temporary pacemaker  Aortagram abdominal w runoff  Ultrasound guided vascular access.    Nausea/Vomiting: None    Postoperative hydration reviewed and adequate.    Pain:      Managed    Neurological Status:       At baseline    Mental Status, Level of Consciousness: Alert and  oriented to person, place, and time    Pulmonary Status:       Adequate oxygenation and airway patent    Complications related to anesthesia: None    Post-anesthesia assessment completed. No concerns    Signed By:

## 2024-10-09 NOTE — PLAN OF CARE
Problem: Discharge Planning  Goal: Discharge to home or other facility with appropriate resources  Outcome: Progressing     Problem: Cardiovascular - Adult  Goal: Maintains optimal cardiac output and hemodynamic stability  Outcome: Progressing  Goal: Absence of cardiac dysrhythmias or at baseline  Outcome: Progressing     Problem: Skin/Tissue Integrity - Adult  Goal: Skin integrity remains intact  Outcome: Progressing  Goal: Incisions, wounds, or drain sites healing without S/S of infection  Outcome: Progressing  Goal: Oral mucous membranes remain intact  Outcome: Progressing

## 2024-10-09 NOTE — DISCHARGE INSTRUCTIONS
passenger in a car at any time, but wear your seatbelt.    Generally, you may return to work about 1 week after being discharged, but you surgeon/cardiologist will verify this depending on your needs and type or work.    Medications   Take all of your medications as prescribed. If you need to stop any of your medications for any reason, please call and tell us. When you go home, you may be prescribed different medications than what you were taking prior to your procedure. Please take these medications as directed. They may be adjusted by the [facility] or your primary cardiologist at your follow-up appointment. Keep a current list of your medication, dosages, and times to be taken in your wallet or purse.    Anti-platelet medications (aspirin) is usually prescribed to prevent blood clots from forming on your new valve, which can cause a stroke. This medicine raises your chance of bleeding. You will take aspirin for the remainder of your life.   Please notify your cardiologist for any of the following signs of bleeding: black or tarry stools, red/pink urine, black or dark brown vomit, nose bleeds, increased bruising, or bleeding gums.     Diet   You should resume your preoperative diet. A low-fat and low-salt diet is recommended. Foods that are high in salt can lead to fluid retention and may cause congestive heart failure. Avoid adding salt in cooking or at the table. Avoid canned, processed, or frozen foods, as they have a lot of salt added to them.    Site Incision Care   Keep your incision sites clean and dry. You may remove your dressings and take a shower when you are home.   Pat your incision sites dry with a clean towel. Do not rub them. Do not soak or lay in water until all of your incisions are fully healed. Do not apply any lotions, creams, powders, or ointments to the incision until the scab has fallen off.   Bruising is common around the incision site. You may also notice a small pea-sized lump. This

## 2024-10-10 ENCOUNTER — APPOINTMENT (OUTPATIENT)
Facility: HOSPITAL | Age: 79
DRG: 267 | End: 2024-10-10
Attending: INTERNAL MEDICINE
Payer: MEDICARE

## 2024-10-10 VITALS
RESPIRATION RATE: 16 BRPM | WEIGHT: 194 LBS | HEIGHT: 69 IN | BODY MASS INDEX: 28.73 KG/M2 | DIASTOLIC BLOOD PRESSURE: 71 MMHG | TEMPERATURE: 98.8 F | HEART RATE: 83 BPM | SYSTOLIC BLOOD PRESSURE: 122 MMHG | OXYGEN SATURATION: 96 %

## 2024-10-10 LAB
ACT BLD: 153 SECS (ref 79–138)
ACT BLD: 330 SECS (ref 79–138)
ANION GAP SERPL CALC-SCNC: 4 MMOL/L (ref 2–12)
BUN SERPL-MCNC: 19 MG/DL (ref 6–20)
BUN/CREAT SERPL: 17 (ref 12–20)
CALCIUM SERPL-MCNC: 8.4 MG/DL (ref 8.5–10.1)
CHLORIDE SERPL-SCNC: 107 MMOL/L (ref 97–108)
CO2 SERPL-SCNC: 26 MMOL/L (ref 21–32)
CREAT SERPL-MCNC: 1.15 MG/DL (ref 0.7–1.3)
EKG ATRIAL RATE: 77 BPM
EKG DIAGNOSIS: NORMAL
EKG P AXIS: 50 DEGREES
EKG P-R INTERVAL: 180 MS
EKG Q-T INTERVAL: 396 MS
EKG QRS DURATION: 80 MS
EKG QTC CALCULATION (BAZETT): 448 MS
EKG R AXIS: 5 DEGREES
EKG T AXIS: 122 DEGREES
EKG VENTRICULAR RATE: 77 BPM
ERYTHROCYTE [DISTWIDTH] IN BLOOD BY AUTOMATED COUNT: 12.6 % (ref 11.5–14.5)
GLUCOSE SERPL-MCNC: 130 MG/DL (ref 65–100)
HCT VFR BLD AUTO: 38.3 % (ref 36.6–50.3)
HGB BLD-MCNC: 12.7 G/DL (ref 12.1–17)
MAGNESIUM SERPL-MCNC: 2 MG/DL (ref 1.6–2.4)
MCH RBC QN AUTO: 30.1 PG (ref 26–34)
MCHC RBC AUTO-ENTMCNC: 33.2 G/DL (ref 30–36.5)
MCV RBC AUTO: 90.8 FL (ref 80–99)
NRBC # BLD: 0 K/UL (ref 0–0.01)
NRBC BLD-RTO: 0 PER 100 WBC
PLATELET # BLD AUTO: 152 K/UL (ref 150–400)
PMV BLD AUTO: 10.8 FL (ref 8.9–12.9)
POTASSIUM SERPL-SCNC: 3.8 MMOL/L (ref 3.5–5.1)
RBC # BLD AUTO: 4.22 M/UL (ref 4.1–5.7)
SODIUM SERPL-SCNC: 137 MMOL/L (ref 136–145)
WBC # BLD AUTO: 8.5 K/UL (ref 4.1–11.1)

## 2024-10-10 PROCEDURE — 85027 COMPLETE CBC AUTOMATED: CPT

## 2024-10-10 PROCEDURE — 6370000000 HC RX 637 (ALT 250 FOR IP)

## 2024-10-10 PROCEDURE — 93005 ELECTROCARDIOGRAM TRACING: CPT

## 2024-10-10 PROCEDURE — 36415 COLL VENOUS BLD VENIPUNCTURE: CPT

## 2024-10-10 PROCEDURE — 83735 ASSAY OF MAGNESIUM: CPT

## 2024-10-10 PROCEDURE — 71045 X-RAY EXAM CHEST 1 VIEW: CPT

## 2024-10-10 PROCEDURE — 80048 BASIC METABOLIC PNL TOTAL CA: CPT

## 2024-10-10 RX ORDER — METOPROLOL SUCCINATE 25 MG/1
25 TABLET, EXTENDED RELEASE ORAL DAILY
Status: DISCONTINUED | OUTPATIENT
Start: 2024-10-10 | End: 2024-10-10 | Stop reason: HOSPADM

## 2024-10-10 RX ADMIN — ASPIRIN 81 MG: 81 TABLET, COATED ORAL at 08:19

## 2024-10-10 RX ADMIN — FENOFIBRATE 54 MG: 54 TABLET ORAL at 08:19

## 2024-10-10 RX ADMIN — TAMSULOSIN HYDROCHLORIDE 0.4 MG: 0.4 CAPSULE ORAL at 08:19

## 2024-10-10 RX ADMIN — METOPROLOL SUCCINATE 25 MG: 25 TABLET, FILM COATED, EXTENDED RELEASE ORAL at 08:19

## 2024-10-10 RX ADMIN — ROSUVASTATIN CALCIUM 20 MG: 20 TABLET, FILM COATED ORAL at 08:19

## 2024-10-10 NOTE — PROGRESS NOTES
[facility] FLOOR Progress Note    Admit Date: 10/9/2024  POD: 1 Day Post-Op      Procedure:  Procedure(s):  Transcatheter aortic valve replacement  Transcatheter aortic valve replacement  Aortic root aortogram  Insert temporary pacemaker  Aortagram abdominal w runoff  Ultrasound guided vascular access    Subjective/overnight events:   Pt seen up in the chair. Family at bedside. In NSR, on room air, afebrile.   Vital signs stable. No events overnight. Bilateral groin sites stable, EKG shows NSR, labs stable, eating, drinking, and voiding. Has been walking in room fine but still needs to walk halls. No complaints at present. Amenable to discharge.   Objective:     /72   Pulse 76   Temp 98 °F (36.7 °C) (Oral)   Resp 16   Ht 1.753 m (5' 9.02\")   Wt 88 kg (194 lb 0.1 oz)   SpO2 94%   BMI 28.64 kg/m²   Temp (24hrs), Av.9 °F (36.6 °C), Min:97.4 °F (36.3 °C), Max:98.4 °F (36.9 °C)      Last 24hr Input/Output:    Intake/Output Summary (Last 24 hours) at 10/10/2024 0738  Last data filed at 10/10/2024 0528  Gross per 24 hour   Intake 700 ml   Output 600 ml   Net 100 ml        EKG/Rhythm:   Encounter Date: 10/09/24   EKG 12 lead   Result Value    Ventricular Rate 77    Atrial Rate 77    P-R Interval 180    QRS Duration 80    Q-T Interval 396    QTc Calculation (Bazett) 448    P Axis 50    R Axis 5    T Axis 122    Diagnosis      Normal sinus rhythm  T wave abnormality, consider lateral ischemia  Abnormal ECG  When compared with ECG of 02-OCT-2024 13:36,  Inverted T waves have replaced nonspecific T wave abnormality in Lateral   leads         Oxygen: As above    CXR: Xray Result (most recent):  XR CHEST STANDARD TWO VW 10/02/2024    Narrative  EXAM: XR CHEST (2 VW)  ACC#: [id number]    INDICATION: pre-op    COMPARISON: 3/8/2013    TECHNIQUE: PA and lateral views of the chest.    FINDINGS:  Neurostimulator device is noted overlying the right hemithorax.  Lungs are clear.  The cardiomediastinal configuration is within

## 2024-10-10 NOTE — DISCHARGE SUMMARY
Cardiology Discharge Summary     [facility]    Patient ID:  Vernon James  [id number]  79 y.o.  1945    Admit Date: 10/9/2024     Discharge Date: 10/10/2024   Admitting Physician: James Alfonso MD   Discharge Physician: James Alfonso MD    Admission and Discharge Diagnoses include:  Non-rheumatic Aortic stenosis    Cardiology Procedures this Admission:  TAVR    Consults:  None    > 30 minutes coordinating discharge.    Hospital Course and Discharge Exam:    Severe AS s/p 26 mm Lees Vivienne S3 Resilia  with Dr. Stallings and with Dr. Alfonso under MAC anesthesia on 10/9/24: Doing well. Discharge home this AM.   CAD s/p PCI/Resolute KEN in the RCA and 3 overlapping Resolute KEN in the LAD (10/22/13). Repeat cath as above. Continue ASA, statin. Resume BB this AM.   Penetrating atherosclerotic ulceration within aortic arch noted on CT scan dated 3/12/24: Images reviewed by Hawa Stallings, Kaden and Erik with Vascular- thought to be an overread. No mention of ulceration on repeat scan here 9/17/24.  HTN: On Toprol XL PTA; resume this AM.   HLD: on statin and fenofibrate PTA; continue.   Prediabetes: A1C 6.0. Follow up with PCP.   Carotid stenosis s/p bilateral carotid endarectomy in 3/13: Continue ASA, statin. OP follow up with Vascular.   CARI s/p Inspire Implant : OP follow up with Sleep Medicine.   Bilateral hernia s/p repair: Noted.   BPH: On Flomax PTA; continue.   ED: Noted.     No issues post TAVR:  No change in meds.  On the day of discharge, ambulatory and taking oral.  All questions answered.  Aware of signs and symptoms warranting urgent medical follow-up or calling 911.    /71   Pulse 83   Temp 98.8 °F (37.1 °C) (Oral)   Resp 16   Ht 1.753 m (5' 9.02\")   Wt 88 kg (194 lb 0.1 oz)   SpO2 96%   BMI 28.64 kg/m²     Physical Exam:  Nondiaphoretic, not in acute distress.  Unlabored, clear to auscultation bilaterally.  Regular rate and rhythm, no murmur, rub, or gallop.

## 2024-10-10 NOTE — CARE COORDINATION
Care Management Initial Assessment       RUR: 8% \"low risk\"  Readmission? Not a true readmission, pt was admitted for planned procedure  1st IM letter given? Yes, given by Patient Access Team on 10/9/24  1st  letter given: Yes, given by Patient Access Team on 10/9/24    Initial note - 0840 AM: Chart reviewed. CM met with pt at the bedside to introduce self and role. Verified contact information and demographics. Pt resides with pt spouse in a two level home with [address]. Pt PCP is with [facility] on [address]. with the last visit being within the last three months. Preferred pharmacy is [facility] in [place], [place]. Pt has a no hx of HH services or a SNF stay. Pt is independent with ADL's and has no DME needs. Pt is an active . Pt has no ACP on file; pt is a FULL code. Pt spouse to transport pt home upon time of d/c. Full assessment below:     10/10/24 0840   Service Assessment   Patient Orientation Alert and Oriented;Person;Place;Situation;Self   Cognition Alert   History Provided By Patient   Primary Caregiver Self   Support Systems Spouse/Significant Other   Patient's Healthcare Decision Maker is: Legal Next of Kin   PCP Verified by CM Yes  ([facility])   Last Visit to PCP Within last 3 months   Prior Functional Level Independent in ADLs/IADLs   Current Functional Level Independent in ADLs/IADLs   Can patient return to prior living arrangement Yes   Ability to make needs known: Good   Family able to assist with home care needs: Yes   Would you like for me to discuss the discharge plan with any other family members/significant others, and if so, who? Yes  (Hannah James (spouse), [phone])   Financial Resources Medicare  (Medicare Part A and B,  for Life)   Community Resources None   Social/Functional History   Lives With Spouse   Type of Home House   Home Layout Two level   Home Access Stairs to enter with rails   Entrance Stairs -

## 2024-10-10 NOTE — PLAN OF CARE
Post-Structural Heart End of Shift Note/Checklist    Vernon James  POD: Day of Surgery    Procedure:  Procedure(s):  Transcatheter aortic valve replacement  Transcatheter aortic valve replacement  Aortic root aortogram  Insert temporary pacemaker  Aortagram abdominal w runoff  Ultrasound guided vascular access    Shift: 2080-5113  Significant events or complaints during shift: None.  Cardiac rhythm: NSR  Vital Signs: Vital signs stable  O2 status: On room air  Febrile/afebrile: Afebrile  Current infusions: none.    Number of times and distance patient has ambulated: 2, to the bathroom.   How is patient tolerating ambulation? Tolerating well .     Patient diet: Cardiac diet.  Intake of diet: n/a.  Is the patient tolerating eating?  Yes.  Is the patient voiding without difficulty? Yes.  Is the patient passing gas? Yes  Date of last BM: .      Checklist:   Are I&O documented and accurate? Yes.  For nightshift, is a daily weight documented? Yes.  Was this weight standing? Yes.  Are labs drawn and resulted? Yes.  Has the patient's post-procedure echo been done yet? Yes.  Post-TAVR patients without an existing PPM- is the EKG done and has it transmitted to Epic? Yes.  Have dressings been removed? Yes.            Predictive Model Details          22 (Normal)  Factor Value    Calculated 10/9/2024 21:40 62% Age 79 years old    Deterioration Index Model 34% Respiratory rate 21     2% Pulse 88     2% Pulse oximetry 94 %     0% WBC count 7.3 K/uL     0% Temperature 98 °F (36.7 °C)     0% Systolic 113     0% Hematocrit 41.4 %        Problem: Discharge Planning  Goal: Discharge to home or other facility with appropriate resources  10/9/2024 2140 by Mabel Choi, RN  Outcome: Progressing  10/9/2024 1055 by Cheyenne Hugo, RN  Outcome: Progressing     Problem: Cardiovascular - Adult  Goal: Maintains optimal cardiac output and hemodynamic stability  10/9/2024 2140 by Mabel Choi, RN  Outcome: Progressing  10/9/2024 1055

## 2024-10-10 NOTE — PROGRESS NOTES
0915: Patient was given all discharge instructions, including site care and medication changes. All questions and concerns addressed. IV;s removed. Wife at bedside during discharge instructions. Wife to transport patient home.

## 2024-10-14 LAB — ECHO BSA: 2.06 M2

## 2024-10-15 NOTE — PROGRESS NOTES
reviewed. May begin Cardiac Rehab if he so choses (lives about an hour away). Follow ups as scheduled.   CAD s/p PCI/Resolute KEN in the RCA and 3 overlapping Resolute KEN in the LAD (10/22/13). Continue ASA, statin, BB.    Penetrating atherosclerotic ulceration within aortic arch noted on CT scan dated 3/12/24: Images reviewed by Kaden Toure and Erik with Vascular- thought to be an overread. No mention of ulceration on repeat scan here 9/17/24.  HTN: On Toprol XL PTA; continue.   HLD: on statin and fenofibrate PTA; continue.   Prediabetes: A1C 6.0. Follow up with PCP.   Carotid stenosis s/p bilateral carotid endarectomy in 3/13: Continue ASA, statin. OP follow up with Vascular.   CARI s/p Inspire Implant : OP follow up with Sleep Medicine.   Bilateral hernia s/p repair: Noted.   BPH: On Flomax PTA; continue.   ED: Noted.     Documentation:    Vernon James was evaluated through a synchronous (real-time) audio encounter. Patient identification was verified at the start of the visit. He (or guardian if applicable) is aware that this is a billable service, which includes applicable co-pays. This visit was conducted with the patient's (and/or legal guardian's) verbal consent. He has not had a related appointment within my department in the past 7 days or scheduled within the next 24 hours.   The patient was located at Home: [address] [place] [address] [place] [address].  The provider was located at Facility (Appt Dept): [address] [place] [id number].    Total Time: 15 minutes      DESEAN Ho - NP

## 2024-10-17 PROBLEM — Z01.810 PREOP CARDIOVASCULAR EXAM: Status: RESOLVED | Noted: 2024-09-17 | Resolved: 2024-10-17

## 2024-10-21 ENCOUNTER — OFFICE VISIT (OUTPATIENT)
Age: 79
End: 2024-10-21
Payer: MEDICARE

## 2024-10-21 DIAGNOSIS — Z95.2 S/P TAVR (TRANSCATHETER AORTIC VALVE REPLACEMENT): Primary | ICD-10-CM

## 2024-10-21 PROCEDURE — 99442 PR PHYS/QHP TELEPHONE EVALUATION 11-20 MIN: CPT | Performed by: THORACIC SURGERY (CARDIOTHORACIC VASCULAR SURGERY)

## 2024-11-18 ENCOUNTER — OFFICE VISIT (OUTPATIENT)
Age: 79
End: 2024-11-18

## 2024-11-18 VITALS
SYSTOLIC BLOOD PRESSURE: 137 MMHG | HEART RATE: 81 BPM | DIASTOLIC BLOOD PRESSURE: 70 MMHG | WEIGHT: 200.4 LBS | OXYGEN SATURATION: 97 % | TEMPERATURE: 98.3 F | BODY MASS INDEX: 29.58 KG/M2

## 2024-11-18 DIAGNOSIS — I35.0 AORTIC STENOSIS, SEVERE: Primary | ICD-10-CM

## 2024-11-18 DIAGNOSIS — I10 PRIMARY HYPERTENSION: ICD-10-CM

## 2024-11-18 PROCEDURE — G8419 CALC BMI OUT NRM PARAM NOF/U: HCPCS | Performed by: THORACIC SURGERY (CARDIOTHORACIC VASCULAR SURGERY)

## 2024-11-18 PROCEDURE — G8484 FLU IMMUNIZE NO ADMIN: HCPCS | Performed by: THORACIC SURGERY (CARDIOTHORACIC VASCULAR SURGERY)

## 2024-11-18 PROCEDURE — 1036F TOBACCO NON-USER: CPT | Performed by: THORACIC SURGERY (CARDIOTHORACIC VASCULAR SURGERY)

## 2024-11-18 PROCEDURE — G8427 DOCREV CUR MEDS BY ELIG CLIN: HCPCS | Performed by: THORACIC SURGERY (CARDIOTHORACIC VASCULAR SURGERY)

## 2024-11-18 NOTE — PROGRESS NOTES
Patient: Vernon James   Age: 79 y.o.     Patient Care Team:  Logan Montoya MD as PCP - General (Family Medicine)  James Alfonso MD as Consulting Physician (Cardiothoracic Surgery)  Clayton Stallings MD (Cardiothoracic Surgery)    PCP: Logan Montoya MD    Cardiologist: GENA    Diagnosis/Reason for Consultation: The encounter diagnosis was S/P TAVR (transcatheter aortic valve replacement).     HPI: 79 y.o. y.o. male  S/P  26 mm Lees Vivienne S3 Resilia  with Dr. Stallings and with Dr. Alfonso under MAC anesthesia on 10/9/24. There were no intra-op or post-op complications and Vernon James was discharged home on POD1. Pre-op sx included SOB, syncope, fatigue, and palpitations.       Overall doing well. Pain is well-controlled on current regimen. No complaints. Questions/concerns answered.     Last Dental Visit:  1.5 years ago    Any dental pain/concerns: No     NYHA Classification: II               Class I (Mild): No limitation of physical activity. Ordinary physical activity does not cause undue fatigue, palpitation, or dyspnea.              Class II (Mild): Slight limitation of physical activity. Comfortable at rest, but ordinary physical activity results in fatigue, palpitation, or dyspnea.              Class III (Moderate): Marked limitation of physical activity. Comfortable at rest, but less than ordinary activity causes fatigue, palpitation, or dyspnea              Class IV (Severe): Unable to carry out any physical activity without discomfort. Symptoms  of cardiac insufficiency at rest.  If any physical activity is undertaken, discomfort is increased.     Angina Classification: 0              Class 0: No symptoms              Class 1: Angina with strenuous exercise              Class 2: Angina with moderate exercise              Class 3: Angina with mild exertion              Walking 1-2 level blocks at normal pace; Climbing 1 flight of stairs at normal pace  Class 4: Angina at any level of physical

## 2024-11-18 NOTE — PROGRESS NOTES
Name: Vernon James   : 1945   Home Phone: [phone]     Reviewed record in preparation for visit and have obtained necessary documentation. Identified pt with two pt identifiers (name and ).     1. Have you been to the ER, urgent care clinic since your last visit?  Hospitalized since your last visit?No    2. Have you seen or consulted any other health care providers outside of the [facility] since your last visit?  Include any pap smears or colon screening. No      Vernon James is being seen for TAVR follow up approximately One month post-operatively”.     Patient counseled on Office contact information and instructed to follow up  with primary cardiologist. Patient given opportunity to ask questions and receive clarification.     Current Medications-Reviewed with Patient    Allergies-Reviewed with Patient        Vitals  /70   Pulse 81   Temp 98.3 °F (36.8 °C)   Wt 90.9 kg (200 lb 6.4 oz)   SpO2 97%   BMI 29.58 kg/m²

## (undated) DEVICE — 1.5 TO 1 DERMACARRIER: Brand: MESHGRAFTTM  II TISSUE EXPANSION SYSTEM

## (undated) DEVICE — PINNACLE INTRODUCER SHEATH: Brand: PINNACLE

## (undated) DEVICE — SPLINT WR VELC FOAM NEUT POS DISP FOR RAD ART ACC SFT STRP

## (undated) DEVICE — CATHETER ETER ANGIO L110CM OD5FR ID046IN L75CM 038IN 145DEG CARD

## (undated) DEVICE — PACK PROCEDURE SURG HRT CATH

## (undated) DEVICE — CATHETER DIAG 5FR L100CM LUMN ID0.047IN JL3.5 CRV 0 SIDE H

## (undated) DEVICE — CATHETER DUAL LUMEN LANGSTON 6F L110CM GWIRE 0.038IN 1000PSI

## (undated) DEVICE — 3M™ TEGADERM™ TRANSPARENT FILM DRESSING FRAME STYLE, 1626W, 4 IN X 4-3/4 IN (10 CM X 12 CM), 50/CT 4CT/CASE: Brand: 3M™ TEGADERM™

## (undated) DEVICE — DRAPE,REIN 53X77,STERILE: Brand: MEDLINE

## (undated) DEVICE — KERLIX BANDAGE ROLL: Brand: KERLIX

## (undated) DEVICE — (D)PREP SKN CHLRAPRP APPL 26ML -- CONVERT TO ITEM 371833

## (undated) DEVICE — SWAN-GANZ HI-SHORE TRUE SIZE THERMODILUTION CATHETER: Brand: SWAN-GANZ HI-SHORE TRUE SIZE

## (undated) DEVICE — GUIDEWIRE ANGIO L150CM OD0.035IN STR FIX PTFE SLD SUPP

## (undated) DEVICE — TRAY PREP DRY W/ PREM GLV 2 APPL 6 SPNG 2 UNDPD 1 OVERWRAP

## (undated) DEVICE — GAUZE SPONGES,12 PLY: Brand: CURITY

## (undated) DEVICE — REM POLYHESIVE ADULT PATIENT RETURN ELECTRODE: Brand: VALLEYLAB

## (undated) DEVICE — Device

## (undated) DEVICE — SUT CHRMC 4-0 27IN SH BRN --

## (undated) DEVICE — MASTISOL ADHESIVE LIQ 2/3ML

## (undated) DEVICE — PROVE COVER: Brand: UNBRANDED

## (undated) DEVICE — GLIDESHEATH SLENDER ACCESS KIT: Brand: GLIDESHEATH SLENDER

## (undated) DEVICE — TUBING PRSS MON L6IN PVC M FEM CONN

## (undated) DEVICE — CATHETER ETER CARD MULTIPAK MULTIPAK 5FR PERFORMA

## (undated) DEVICE — SYRINGE ANGIO 12ML COR CTRL FIX M LUER CONN RNG GRP SLD RNG

## (undated) DEVICE — DRAPE,LAPAROTOMY,T,PEDI,STERILE: Brand: MEDLINE

## (undated) DEVICE — SYR 10ML LUER LOK 1/5ML GRAD --

## (undated) DEVICE — GUIDEWIRE VASC L145CM 0.035IN J TIP L3MM PTFE FIX COR NAMIC

## (undated) DEVICE — GUIDEWIRE VASCULAR L145CM 0.035IN J TIP L3MM PTFE FIX COR NAMIC

## (undated) DEVICE — CATHETER DIAG 5FR L100CM LUMN ID0.047IN JR4 CRV 0 SIDE H

## (undated) DEVICE — COTTON BALLS: Brand: DEROYAL

## (undated) DEVICE — INTENDED FOR TISSUE SEPARATION, AND OTHER PROCEDURES THAT REQUIRE A SHARP SURGICAL BLADE TO PUNCTURE OR CUT.: Brand: BARD-PARKER ® CARBON RIB-BACK BLADES

## (undated) DEVICE — SYRINGE ANGIO 10 CC BRL STD PRNT POLYCARB LT BLU MEDALLION

## (undated) DEVICE — OCCLUSIVE GAUZE STRIP,3% BISMUTH TRIBROMOPHENATE IN PETROLATUM BLEND: Brand: XEROFORM

## (undated) DEVICE — PACK,EENT,TURBAN DRAPE,PK II: Brand: MEDLINE

## (undated) DEVICE — 3 TO 1 DERMACARRIER: Brand: MESHGRAFTTM II TISSUE EXPANSION SYSTEM

## (undated) DEVICE — GUIDEWIRE ANGIO 0.038INX260CM 3MM J TIP FIX COR PTFE PERIPH

## (undated) DEVICE — NEEDLE HYPO 25GA L1.5IN BVL ORIENTED ECLIPSE

## (undated) DEVICE — CATHETER COR DIAG 4.0 5FR 100CM 0 SIDE H

## (undated) DEVICE — STERILE POLYISOPRENE POWDER-FREE SURGICAL GLOVES: Brand: PROTEXIS

## (undated) DEVICE — SOLUTION IV 1000ML 0.9% SOD CHL

## (undated) DEVICE — SUT SLK 3-0 30IN SH BLK --

## (undated) DEVICE — KENDALL SCD EXPRESS SLEEVES, KNEE LENGTH, MEDIUM: Brand: KENDALL SCD

## (undated) DEVICE — INFECTION CONTROL KIT SYS

## (undated) DEVICE — KIT ACCS INTRO 4FR L10CM NDL 21GA L7CM GWIRE L40CM

## (undated) DEVICE — Z DISCONTINUED USE (MFG CAT 7984-37) SOLUTION IV SODIUM CHL 0.9% 100 ML INJ

## (undated) DEVICE — SUTURE PERMAHAND SZ 3-0 L18IN NONABSORBABLE BLK L26MM SH C013D

## (undated) DEVICE — DRAPE,EXTREMITY,89X128,STERILE: Brand: MEDLINE